# Patient Record
Sex: FEMALE | Race: WHITE | NOT HISPANIC OR LATINO | Employment: FULL TIME | URBAN - METROPOLITAN AREA
[De-identification: names, ages, dates, MRNs, and addresses within clinical notes are randomized per-mention and may not be internally consistent; named-entity substitution may affect disease eponyms.]

---

## 2018-05-29 ENCOUNTER — OFFICE VISIT CONVERTED (OUTPATIENT)
Dept: FAMILY MEDICINE CLINIC | Facility: CLINIC | Age: 28
End: 2018-05-29
Attending: NURSE PRACTITIONER

## 2020-08-28 ENCOUNTER — OFFICE VISIT CONVERTED (OUTPATIENT)
Dept: FAMILY MEDICINE CLINIC | Age: 30
End: 2020-08-28
Attending: NURSE PRACTITIONER

## 2020-10-23 ENCOUNTER — OFFICE VISIT CONVERTED (OUTPATIENT)
Dept: FAMILY MEDICINE CLINIC | Age: 30
End: 2020-10-23
Attending: NURSE PRACTITIONER

## 2020-10-23 ENCOUNTER — HOSPITAL ENCOUNTER (OUTPATIENT)
Dept: OTHER | Facility: HOSPITAL | Age: 30
Discharge: HOME | End: 2020-10-23
Attending: NURSE PRACTITIONER

## 2020-10-23 LAB
ALBUMIN SERPL-MCNC: 4.4 G/DL (ref 3.5–5)
ALBUMIN/GLOB SERPL: 1.7 {RATIO} (ref 1.4–2.6)
ALP SERPL-CCNC: 90 U/L (ref 42–98)
ALT SERPL-CCNC: 15 U/L (ref 10–40)
ANION GAP SERPL CALC-SCNC: 15 MMOL/L (ref 8–19)
AST SERPL-CCNC: 14 U/L (ref 15–50)
BASOPHILS # BLD AUTO: 0.06 10*3/UL (ref 0–0.2)
BASOPHILS NFR BLD AUTO: 0.8 % (ref 0–3)
BILIRUB SERPL-MCNC: 0.68 MG/DL (ref 0.2–1.3)
BUN SERPL-MCNC: 10 MG/DL (ref 5–25)
BUN/CREAT SERPL: 14 {RATIO} (ref 6–20)
CALCIUM SERPL-MCNC: 9.8 MG/DL (ref 8.7–10.4)
CHLORIDE SERPL-SCNC: 105 MMOL/L (ref 99–111)
CHOLEST SERPL-MCNC: 127 MG/DL (ref 107–200)
CHOLEST/HDLC SERPL: 2.6 {RATIO} (ref 3–6)
CONV ABS IMM GRAN: 0.02 10*3/UL (ref 0–0.2)
CONV CO2: 26 MMOL/L (ref 22–32)
CONV IMMATURE GRAN: 0.3 % (ref 0–1.8)
CONV TOTAL PROTEIN: 7 G/DL (ref 6.3–8.2)
CREAT UR-MCNC: 0.71 MG/DL (ref 0.5–0.9)
DEPRECATED RDW RBC AUTO: 41.8 FL (ref 36.4–46.3)
EOSINOPHIL # BLD AUTO: 0.11 10*3/UL (ref 0–0.7)
EOSINOPHIL # BLD AUTO: 1.4 % (ref 0–7)
ERYTHROCYTE [DISTWIDTH] IN BLOOD BY AUTOMATED COUNT: 12.7 % (ref 11.7–14.4)
FOLATE SERPL-MCNC: >20 NG/ML (ref 4.8–20)
GFR SERPLBLD BASED ON 1.73 SQ M-ARVRAT: >60 ML/MIN/{1.73_M2}
GLOBULIN UR ELPH-MCNC: 2.6 G/DL (ref 2–3.5)
GLUCOSE SERPL-MCNC: 89 MG/DL (ref 65–99)
HCT VFR BLD AUTO: 42.6 % (ref 37–47)
HDLC SERPL-MCNC: 49 MG/DL (ref 40–60)
HGB BLD-MCNC: 13.7 G/DL (ref 12–16)
IRON SATN MFR SERPL: 30 % (ref 20–55)
IRON SERPL-MCNC: 121 UG/DL (ref 60–170)
LDLC SERPL CALC-MCNC: 59 MG/DL (ref 70–100)
LYMPHOCYTES # BLD AUTO: 2.04 10*3/UL (ref 1–5)
LYMPHOCYTES NFR BLD AUTO: 25.9 % (ref 20–45)
MCH RBC QN AUTO: 28.9 PG (ref 27–31)
MCHC RBC AUTO-ENTMCNC: 32.2 G/DL (ref 33–37)
MCV RBC AUTO: 89.9 FL (ref 81–99)
MONOCYTES # BLD AUTO: 0.53 10*3/UL (ref 0.2–1.2)
MONOCYTES NFR BLD AUTO: 6.7 % (ref 3–10)
NEUTROPHILS # BLD AUTO: 5.12 10*3/UL (ref 2–8)
NEUTROPHILS NFR BLD AUTO: 64.9 % (ref 30–85)
NRBC CBCN: 0 % (ref 0–0.7)
OSMOLALITY SERPL CALC.SUM OF ELEC: 293 MOSM/KG (ref 273–304)
PLATELET # BLD AUTO: 264 10*3/UL (ref 130–400)
PMV BLD AUTO: 10.5 FL (ref 9.4–12.3)
POTASSIUM SERPL-SCNC: 4.4 MMOL/L (ref 3.5–5.3)
RBC # BLD AUTO: 4.74 10*6/UL (ref 4.2–5.4)
SODIUM SERPL-SCNC: 142 MMOL/L (ref 135–147)
TIBC SERPL-MCNC: 406 UG/DL (ref 245–450)
TRANSFERRIN SERPL-MCNC: 284 MG/DL (ref 250–380)
TRIGL SERPL-MCNC: 95 MG/DL (ref 40–150)
TSH SERPL-ACNC: 0.95 M[IU]/L (ref 0.27–4.2)
VIT B12 SERPL-MCNC: 328 PG/ML (ref 211–911)
VLDLC SERPL-MCNC: 19 MG/DL (ref 5–37)
WBC # BLD AUTO: 7.88 10*3/UL (ref 4.8–10.8)

## 2021-05-16 VITALS
TEMPERATURE: 98 F | RESPIRATION RATE: 16 BRPM | SYSTOLIC BLOOD PRESSURE: 131 MMHG | HEART RATE: 80 BPM | OXYGEN SATURATION: 98 % | DIASTOLIC BLOOD PRESSURE: 67 MMHG | HEIGHT: 55 IN | BODY MASS INDEX: 67.11 KG/M2 | WEIGHT: 290 LBS

## 2021-05-18 NOTE — PROGRESS NOTES
Lorraine Garza Jennifer  1990     Office/Outpatient Visit    Visit Date: Fri, Aug 28, 2020 09:45 am    Provider: Lizet Lizama N.P. (Assistant: Mari Carrasco LPN)    Location: [facility]        Electronically signed by Lizet Lizama N.P. on  08/28/2020 10:54:26 AM                             Subjective:        CC: Mrs. Garza is a 30 year old White female.  This is her first visit to the clinic.          HPI:           PHQ-9 Depression Screening: Completed form scanned and in chart; Total Score 4           Dx with generalized anxiety disorder; her symptom complex includes chest pain, palpitations, and shortness of breath.  True panic attacks occur in addition to generalized anxiety.  The frequency symptoms is nearly constant.  Apparent triggers include life stressors (new baby, building/selling house).  She is not currently being treated for anxiety.  Previous attempts at treatment have included antidepressants Zoloft.  was on 50mg for years She felt like this worked well - stopped due to pregnancy    ROS:     CONSTITUTIONAL:  Positive for unintentional weight gain ( but is working on losing again ).   Negative for chills, fatigue or fever.      CARDIOVASCULAR:  Negative for chest pain and pedal edema.      RESPIRATORY:  Negative for recent cough and dyspnea.      NEUROLOGICAL:  Negative for dizziness, fainting, headaches and paresthesias.      ENDOCRINE:  Negative for hair loss, polydipsia and polyphagia.      ALLERGIC/IMMUNOLOGIC:  Negative for seasonal allergies.      PSYCHIATRIC:  Positive for anxiety, feelings of stress and insomnia; trouble falling asleep.   Negative for depression, mood swings or suicidal thoughts.          Past Medical History / Family History / Social History:         Last Reviewed on 8/28/2020 10:18 AM by Lizet Lizama    Past Medical History:             PAST MEDICAL HISTORY     Hospitalizations: fever, admitted once on 1992         GYNECOLOGICAL  HISTORY:        No problems with menstrual cycles.    Not currently using any form of contraception.          CURRENT MEDICAL PROVIDERS:    Obstetrician/Gynecologist: dr cooper nuñez         PREVENTIVE HEALTH MAINTENANCE             DENTAL CLEANING: was last done - Dr. Orourke     EYE EXAM: was last done -   Paul     MAMMOGRAM: has never been done and has no medical need for one at this time     PAP SMEAR: was last done 2019 with normal results         Surgical History:         tooth extraction;    wisdom teeth extraction ;     Positive for     section: X 2; ;  was emergency due to pre-eclampsia; and    Gastric Sleeve 2017  - Dr. Nuñez (does not follow );;         Family History:     Father: Medical history unknown     Mother: Hypertension;  COPD     Brother(s): Healthy; 1 brother(s) total     Paternal Grandfather: ;  Alcoholism     Paternal Grandmother: Hypertension     Maternal Grandfather:  at age 75; Cause of death was CHF;  Type 2 Diabetes;  Alcoholism     Maternal Grandmother:  at age 78; Cause of death was alzheimer;  Hypertension;  Breast Cancer; Colon Cancer         Social History:     Occupation: Homemaker     Marital Status:      Children: 2 children         Tobacco/Alcohol/Supplements:     Last Reviewed on 2020 10:18 AM by Lizet Lizama    Tobacco: She has never smoked.          Alcohol: Frequency:    rarely; caffeine         Substance Abuse History:     Last Reviewed on 2020 10:18 AM by Lizet Lizama    None         Mental Health History:     Last Reviewed on 2020 10:18 AM by Lizet Lizama        Generalized Anxiety Disorder         Communicable Diseases (eg STDs):     Last Reviewed on 2020 10:18 AM by Lizet Lizama    Reportable health conditions;     trich         Current Problems:     Last Reviewed on 2020 10:18 AM by Lizet Lizama    Essential (primary) hypertension    Generalized  anxiety disorder    Nutritional anemia, unspecified    Encounter for screening for depression        Immunizations:     MenABCWY + OMV or MenABCWY + 1/4OMV or Placebo 3/5/2014    [employer]ck 9-Valent HPV Vaccine 11/12/2012    [employer]ck 9-Valent HPV Vaccine 1/16/2013    [employer]ck 9-Valent HPV Vaccine 5/15/2013    [facility] study MenACWY and / or Men B 11/9/2011    [facility] study MenACWY and / or Men B 1/18/2012    PPD 3/19/2014        Allergies:     Last Reviewed on 8/28/2020 10:18 AM by Lizet Lizama    No Known Allergies.        Current Medications:     Last Reviewed on 8/28/2020 10:18 AM by Lizet Lizama    multivitamin oral tablet [1 tab daily]        Objective:        Vitals: LMP - 8/4/2020        Current: 8/28/2020 9:49:00 AM    Ht:  5 ft, 6.142 in;  Wt: 199.2 lbs;  BMI: 32.0T: 97.2 F (oral);  BP: 136/89 mm Hg (left arm, sitting);  P: 65 bpm (left arm (BP Cuff), sitting);  sCr: 0.65 mg/dL;  GFR: 146.61        Exams:     PHYSICAL EXAM:     GENERAL: vital signs recorded - well developed, well nourished, obese;  no apparent distress;     RESPIRATORY: normal appearance and symmetric expansion of chest wall; normal respiratory rate and pattern with no distress; normal breath sounds with no rales, rhonchi, wheezes or rubs;     CARDIOVASCULAR: normal rate; rhythm is regular;  no edema;     MUSCULOSKELETAL: normal gait; normal range of motion of all major muscle groups; no limb or joint pain with range of motion;     NEUROLOGIC: mental status: alert and oriented x 3;     PSYCHIATRIC: affect/demeanor: anxious;  normal speech pattern; normal thought and perception;         Assessment:         Z13.31   Encounter for screening for depression       F41.1   Generalized anxiety disorder       I10   Essential (primary) hypertension           ORDERS:         Meds Prescribed:       [New Rx] sertraline 50 mg oral tablet [take 1/2 tab for 1 week then  1 tablet (50 mg) by oral route once daily], #30 (thirty) tablets, Refills: 1 (one)          Other Orders:         Depression screen positive and follow up plan documented  (In-House)                      Plan:         Encounter for screening for depression    MIPS PHQ-9 Depression Screening: Completed form scanned and in chart; Total Score 4 Positive Depression Screen: Pharmacologic intervention initiated/modified           Orders:         Depression screen positive and follow up plan documented  (In-House)              Generalized anxiety disorderwimonroe restart her back on zoloft - Discussed need to discuss with GYN - and need for birth control - discussed risks assoicated with taking if accidentally becomes pregnant - she is aware.  Will have her follwo up in 6-8 weeks with wellness/check up           Prescriptions:       [New Rx] sertraline 50 mg oral tablet [take 1/2 tab for 1 week then  1 tablet (50 mg) by oral route once daily], #30 (thirty) tablets, Refills: 1 (one)         Essential (primary) hypertension        RECOMMENDATIONS given include: perform routine monitoring of blood pressure with home blood pressure cuff, exercise, reduction of dietary salt intake, weight loss, and stress reduction.              Patient Recommendations:        For  Essential (primary) hypertension:    Begin monitoring your blood pressure by brief nurse visits at our office, a home blood pressure monitor, or by checking on the machines in pharmacies or stores.  Keep a log of the readings. Maintain a regular exercise program. Reduce the amount of salt in your diet. Try to lose some weight; even modest weight reduction can improve your blood pressure. Try and reduce the effects of stress on blood pressure by relaxation, meditation, biofeedback, exercise or other stress reduction methods.              Charge Capture:         Primary Diagnosis:     Z13.31  Encounter for screening for depression           Orders:      66734  Office visit - new pt, level 3  (In-House)              Depression screen positive  and follow up plan documented  (In-House)              F41.1  Generalized anxiety disorder     I10  Essential (primary) hypertension         ADDENDUMS:      ____________________________________    Addendum: 09/21/2020 12:45 Lizet Hernandez        [id number]    [id number]

## 2021-05-18 NOTE — PROGRESS NOTES
Lorraine Nayak 1990     Preventive Medicine Services    Visit Date: Fri, Oct 23, 2020 9:25 am    Provider: Lizet Lizama N.P. (Assistant: Kim Chavez MA )    Location:         Electronically signed by Lizet Lizama N.P. on  10/23/2020 01:06:49 PM                             Subjective:        CC: Mrs. Nayak is a 30 year old White female.  This is a follow-up visit.          HPI: LMP  1 month ago           Mrs. Nayak presents with encounter for general adult medical examination without abnormal findings.  She cannot recall when she last had a physical exam.  Her last menstrual period was 1 month ago.  She performs breast self-exams monthly.   Her last Pap smear was <6 months ago.   She has never had a mammogram. Preventative Health updated today.  She is current with her Td and influenza immunization.  Mrs. Nayak denies any history of abnormal Pap smears.            Essential (primary) hypertension details; compliance with treatment has been good.  She is tolerating the medication well without side effects.  She did not bring her blood pressure diary, but says that pressures have been okay.  history of  - no current medication  no current problems or concerns          Additionally, she presents with history of generalized anxiety disorder.  her anxiety disorder was originally diagnosed many years ago.  recently started on sertraline, currently taking 50mg  feels like it is helping, but thinks may need a higher dose.  She is currently going through a divorce, currently starting a new job and has a new baby at home - so is very stresssed           Complaint of nutritional anemia, unspecified..  She has a history of bariatric surgery and has had problems with anemia related to her malabsorbtion of nutrients in the past.  She was not able to take oral iron due to SE/GI upset  Feels like she is bruising more easily and wanting to get iron checked           Complaint of bariatric surgery  status..  Gastric sleeve in 2017     ROS:     CONSTITUTIONAL:  Positive for fatigue.   Negative for chills or fever.      CARDIOVASCULAR:  Positive for palpitations ( happens at least daily ).   Negative for chest pain or pedal edema.      RESPIRATORY:  Positive for dyspnea ( with the anxiety ).   Negative for recent cough.      GASTROINTESTINAL:  Negative for abdominal pain, nausea and vomiting.      GENITOURINARY:  Negative for dysuria and change in urine stream.      HEMATOLOGIC/LYMPHATIC:  Positive for easy bruising.      NEUROLOGICAL:  Negative for dizziness, fainting, headaches and paresthesias.      ENDOCRINE:  Negative for hair loss, polydipsia and polyphagia.      ALLERGIC/IMMUNOLOGIC:  Negative for seasonal allergies.      PSYCHIATRIC:  Positive for anxiety, crying spells, depression, feelings of stress and mood swings.   Negative for suicidal thoughts.          Past Medical History / Family History / Social History:         Last Reviewed on 10/23/2020 12:50 PM by Lizet Lizama    Past Medical History:             PAST MEDICAL HISTORY     Hospitalizations: fever, admitted once on          GYNECOLOGICAL HISTORY:        No problems with menstrual cycles.    Not currently using any form of contraception.          CURRENT MEDICAL PROVIDERS:    Obstetrician/Gynecologist: dr cooper nuñez         PREVENTIVE HEALTH MAINTENANCE             DENTAL CLEANING: was last done - Dr. Orourke     EYE EXAM: was last done -   Paul     MAMMOGRAM: has never been done and has no medical need for one at this time     PAP SMEAR: was last done 2019 with normal results         Surgical History:         tooth extraction;    wisdom teeth extraction ;     Positive for     section: X 2; ;  was emergency due to pre-eclampsia; and    Gastric Sleeve 2017  - Dr. Nuñez (does not follow );;         Family History:     Father: Medical history unknown     Mother: Hypertension;  COPD      Brother(s): Healthy; 1 brother(s) total     Paternal Grandfather: ;  Alcoholism     Paternal Grandmother: Hypertension     Maternal Grandfather:  at age 75; Cause of death was CHF;  Type 2 Diabetes;  Alcoholism     Maternal Grandmother:  at age 78; Cause of death was alzheimer;  Hypertension;  Breast Cancer; Colon Cancer         Social History:     Occupation: Homemaker     Marital Status:      Children: 2 children         Tobacco/Alcohol/Supplements:     Last Reviewed on 10/23/2020 12:50 PM by Lizet Lizama    Tobacco: She has never smoked.          Alcohol: Frequency:    rarely; caffeine         Substance Abuse History:     Last Reviewed on 10/23/2020 12:50 PM by Lizet Lizama    None         Mental Health History:     Last Reviewed on 10/23/2020 12:50 PM by Lizet Lizama        Generalized Anxiety Disorder         Communicable Diseases (eg STDs):     Last Reviewed on 10/23/2020 12:50 PM by Lizet Lizama    Reportable health conditions;     trich         Current Problems:     Last Reviewed on 10/23/2020 12:50 PM by Lizet Lizama    Essential (primary) hypertension    Generalized anxiety disorder    Nutritional anemia, unspecified    Encounter for screening for depression    Encounter for immunization    Bariatric surgery status    Encounter for general adult medical examination without abnormal findings        Immunizations:     MenABCWY + OMV or MenABCWY + 1/4OMV or Placebo 3/5/2014    [employer]ck 9-Valent HPV Vaccine 2012    Merck 9-Valent HPV Vaccine 2013    [employer]ck 9-Valent HPV Vaccine 5/15/2013    [employer] study MenACWY and / or Men B 2011    [employer] study MenACWY and / or Men B 2012    PPD 3/19/2014        Allergies:     Last Reviewed on 10/23/2020 12:50 PM by Lizet Lizama    No Known Allergies.        Current Medications:     Last Reviewed on 10/23/2020 12:50 PM by Lizet Lizama    multivitamin oral tablet [1 tab daily]    sertraline 50 mg  oral tablet [take 1 1/2 tab (75mg)  by oral route once daily for anxiety/ depression]    propranoloL 10 mg oral tablet [take 1 tablet (10 mg) by oral route up to TID PRN anxiety/ palpitations]        Objective:        Vitals:         Current: 10/23/2020 9:31:00 AM    Ht:  5 ft, 6.142 in;  Wt: 298.2 lbs;  BMI: 47.9T: 97 F (temporal);  BP: 128/78 mm Hg (left arm, sitting);  P: 62 bpm (left arm (BP Cuff), sitting);  sCr: 0.65 mg/dL;  GFR: 174.04        Exams:     PHYSICAL EXAM:     GENERAL: vital signs recorded - well developed, well nourished, obese;  no apparent distress;     NECK: range of motion is normal;     RESPIRATORY: normal appearance and symmetric expansion of chest wall; normal respiratory rate and pattern with no distress; normal breath sounds with no rales, rhonchi, wheezes or rubs;     CARDIOVASCULAR: normal rate; rhythm is regular;  no edema;     GASTROINTESTINAL: nontender; normal bowel sounds; no organomegaly;     MUSCULOSKELETAL: normal gait; normal range of motion of all major muscle groups; no limb or joint pain with range of motion;     NEUROLOGIC: mental status: alert and oriented x 3;     PSYCHIATRIC: affect/demeanor: anxious;  normal speech pattern; normal thought and perception;         Assessment:         Z00.00   Encounter for general adult medical examination without abnormal findings       Z23   Encounter for immunization       I10   Essential (primary) hypertension       F41.1   Generalized anxiety disorder       D53.9   Nutritional anemia, unspecified       Z98.84   Bariatric surgery status           ORDERS:         Meds Prescribed:       [New Rx] propranoloL 10 mg oral tablet [take 1 tablet (10 mg) by oral route up to TID PRN anxiety/ palpitations], #30 (thirty) tablets, Refills: 0 (zero)       [Refilled] sertraline 50 mg oral tablet [take 1 1/2 tab (75mg)  by oral route once daily for anxiety/ depression], #30 (thirty) tablets, Refills: 2 (two)         Lab Orders:       [id number]  B12FO -  [facility] Vitamin B12 with Folate  (Send-Out)            [id number]  IRONP - [facility] Iron and TIBC  (Send-Out)            82499  PHYSF - [facility] PHYSICAL: CMP, CBC, TSH, LIPID: [id number], [id number], [id number], [id number]  (Send-Out)              Procedures Ordered:       [id number]  Immunization administration; one vaccine  (In-House)              Other Orders:       [id number]  Influenza virus vaccine, quadrivalent, split virus, preservative free 3 years of age & older  (In-House)              Depression screen negative  (In-House)                      Plan:         Encounter for general adult medical examination without abnormal findings    LABORATORY:  Labs ordered to be performed today include PHYSICAL PANEL; CMP, CBC, TSH, LIPID.  MIPS PHQ-9 Depression Screening: Completed form scanned and in chart; Total Score 3; Negative Depression Screen           Orders:       [id number]  [facility] - [facility] PHYSICAL: CMP, CBC, TSH, LIPID: [id number], [id number], [id number], [id number]  (Send-Out)              Depression screen negative  (In-House)              Encounter for immunization          Immunizations:       96642  Immunization administration; one vaccine  (In-House)            19002  Influenza virus vaccine, quadrivalent, split virus, preservative free 3 years of age & older  (In-House)                Dose (ml): 0.5  Site: left deltoid  Route: intramuscular  Administered by: Kim Chavez          : Seqirus  Lot #: [id number]  Exp: 06/30/2021          NDC: [address]-96        Essential (primary) hypertensioncontinue to monitor        Generalized anxiety disorderwill increase her sertraline and give her a propranolol for PRN palpitations when these occur  follow up in 3 months          Prescriptions:       [New Rx] propranoloL 10 mg oral tablet [take 1 tablet (10 mg) by oral route up to TID PRN anxiety/ palpitations], #30 (thirty) tablets, Refills: 0 (zero)       [Refilled] sertraline 50 mg oral tablet [take 1 1/2 tab (75mg)  by oral route once daily for anxiety/  depression], #30 (thirty) tablets, Refills: 2 (two)         Nutritional anemia, unspecifiedwill check labs today        Bariatric surgery status    LABORATORY:  Labs ordered to be performed today include B12 with Folate and Iron, serum and TIBC.            Orders:       32193  B12FO - [facility] Vitamin B12 with Folate  (Send-Out)            22439  IRONP - [facility] Iron and TIBC  (Send-Out)                  Charge Capture:         Primary Diagnosis:     Z00.00  Encounter for general adult medical examination without abnormal findings           Orders:      00359  Preventive medicine, established patient, age 18-39 years  (In-House)              Depression screen negative  (In-House)              Z23  Encounter for immunization           Orders:      16399  Immunization administration; one vaccine  (In-House)            02418  Influenza virus vaccine, quadrivalent, split virus, preservative free 3 years of age & older  (In-House)              I10  Essential (primary) hypertension     F41.1  Generalized anxiety disorder     D53.9  Nutritional anemia, unspecified     Z98.84  Bariatric surgery status

## 2021-06-18 ENCOUNTER — OFFICE VISIT (OUTPATIENT)
Dept: FAMILY MEDICINE CLINIC | Age: 31
End: 2021-06-18

## 2021-06-18 VITALS
HEART RATE: 66 BPM | TEMPERATURE: 98.6 F | HEIGHT: 66 IN | BODY MASS INDEX: 47.09 KG/M2 | WEIGHT: 293 LBS | DIASTOLIC BLOOD PRESSURE: 86 MMHG | SYSTOLIC BLOOD PRESSURE: 138 MMHG

## 2021-06-18 DIAGNOSIS — R00.2 PALPITATIONS: ICD-10-CM

## 2021-06-18 DIAGNOSIS — F41.9 ANXIETY: Primary | Chronic | ICD-10-CM

## 2021-06-18 PROBLEM — F32.A DEPRESSION: Status: ACTIVE | Noted: 2021-06-18

## 2021-06-18 PROBLEM — R01.1 HEART MURMUR: Status: ACTIVE | Noted: 2021-06-18

## 2021-06-18 PROBLEM — I10 HIGH BLOOD PRESSURE: Status: ACTIVE | Noted: 2021-06-18

## 2021-06-18 PROBLEM — Z98.84 HISTORY OF BARIATRIC SURGERY: Status: ACTIVE | Noted: 2021-06-18

## 2021-06-18 PROBLEM — K64.9 HEMORRHOIDS: Status: ACTIVE | Noted: 2021-06-18

## 2021-06-18 PROBLEM — K21.9 ACID REFLUX: Status: ACTIVE | Noted: 2021-06-18

## 2021-06-18 PROCEDURE — 99214 OFFICE O/P EST MOD 30 MIN: CPT | Performed by: NURSE PRACTITIONER

## 2021-06-18 NOTE — PROGRESS NOTES
"Chief Complaint  Med Refill    Subjective          Lorraine Nayak presents to [facility]  Regarding anxiety, Lorraine is here for refills on her sertraline.Last visit in October 2020 last encounter noted new prescription for propanolol to take as needed she is currently taking PRN and works well. She does not need refills today  She reports that she has been out of her medication for about 3 months.  She has noticed that over the past 3 months that she has been having some palpitations.  This generally occurs when she feels like she is eating quickly or at least this is the time that she notices it the most.  She reports that her heart rate will get as high as 120 based on her apple watch report.  Other than feeling the palpitations, she denies any other associated symptoms including dizziness, lightheadedness, or chest pain.         Review of Systems   Constitutional: Negative for fatigue and fever.   Respiratory: Negative for cough, chest tightness and shortness of breath.    Cardiovascular: Positive for palpitations (notices when she eats quickly.  Not sure if related to type of foods). Negative for chest pain.   Musculoskeletal: Negative for back pain and neck pain.   Neurological: Negative for dizziness.   Psychiatric/Behavioral: Negative for behavioral problems and dysphoric mood. The patient is nervous/anxious.         Health Maintenance Due   Topic Date Due   • ANNUAL PHYSICAL  Never done   • COVID-19 Vaccine (1) Never done   • HEPATITIS C SCREENING  Never done   • PAP SMEAR  Never done        Objective     Vital Signs:   /86 (BP Location: Left arm, Patient Position: Sitting)   Pulse 66   Temp 98.6 °F (37 °C)   Ht 168 cm (66.14\")   Wt (!) 140 kg (309 lb 9.6 oz)   BMI 49.76 kg/m²       Physical Exam  Constitutional:       General: She is not in acute distress.     Appearance: Normal appearance. She is obese.   HENT:      Head: Normocephalic.   Cardiovascular:    "   Rate and Rhythm: Normal rate and regular rhythm.   Pulmonary:      Effort: Pulmonary effort is normal.      Breath sounds: Normal breath sounds.   Musculoskeletal:         General: Normal range of motion.   Neurological:      General: No focal deficit present.      Mental Status: She is alert and oriented to person, place, and time.   Psychiatric:         Mood and Affect: Mood normal.         Behavior: Behavior normal.          Result Review :     The following data was reviewed by: ROSA ISELA Blunt on 06/18/2021:  Physical Test Panel (10/23/2020 10:21)                 Assessment and Plan    Diagnoses and all orders for this visit:    1. Anxiety (Primary)  Comments:  Will resume sertraline but at 50mg  consider increase at the 8 week follow up, continue propanolol PRN  follow up 8 weeks  Orders:  -     sertraline (ZOLOFT) 50 MG tablet; Take 1 tablet by mouth Daily for 60 days.  Dispense: 60 tablet; Refill: 0    2. Palpitations  Comments:  Will see if resuming the anxiety medication improves symptoms, if continues, will consider 24 hour holter/labs at next visit in 8 weeks - sooner if symptoms            Follow Up {Wrapup  Communications :23}   Return in about 8 weeks (around 8/13/2021).      Patient was given instructions and counseling regarding her condition or for health maintenance advice. Please see specific information pulled into the AVS if appropriate.

## 2021-07-02 VITALS
BODY MASS INDEX: 47.09 KG/M2 | TEMPERATURE: 97 F | SYSTOLIC BLOOD PRESSURE: 128 MMHG | HEIGHT: 66 IN | HEART RATE: 62 BPM | DIASTOLIC BLOOD PRESSURE: 78 MMHG | WEIGHT: 293 LBS

## 2021-07-02 VITALS
WEIGHT: 199.2 LBS | TEMPERATURE: 97.2 F | SYSTOLIC BLOOD PRESSURE: 136 MMHG | BODY MASS INDEX: 32.02 KG/M2 | HEART RATE: 65 BPM | DIASTOLIC BLOOD PRESSURE: 89 MMHG | HEIGHT: 66 IN

## 2021-07-20 ENCOUNTER — OFFICE VISIT (OUTPATIENT)
Dept: FAMILY MEDICINE CLINIC | Age: 31
End: 2021-07-20

## 2021-07-20 VITALS
WEIGHT: 293 LBS | TEMPERATURE: 98.3 F | BODY MASS INDEX: 47.09 KG/M2 | HEART RATE: 78 BPM | HEIGHT: 66 IN | DIASTOLIC BLOOD PRESSURE: 90 MMHG | SYSTOLIC BLOOD PRESSURE: 132 MMHG

## 2021-07-20 DIAGNOSIS — K21.9 GASTROESOPHAGEAL REFLUX DISEASE WITHOUT ESOPHAGITIS: ICD-10-CM

## 2021-07-20 DIAGNOSIS — L73.2 HIDRADENITIS SUPPURATIVA OF RIGHT AXILLA: Primary | ICD-10-CM

## 2021-07-20 PROCEDURE — 99213 OFFICE O/P EST LOW 20 MIN: CPT | Performed by: NURSE PRACTITIONER

## 2021-07-20 RX ORDER — DOXYCYCLINE 100 MG/1
100 CAPSULE ORAL 2 TIMES DAILY
Qty: 20 CAPSULE | Refills: 0 | Status: SHIPPED | OUTPATIENT
Start: 2021-07-20 | End: 2021-08-13

## 2021-07-20 NOTE — PATIENT INSTRUCTIONS
Hidradenitis Suppurativa  Hidradenitis suppurativa is a long-term (chronic) skin disease. It is similar to a severe form of acne, but it affects areas of the body where acne would be unusual, especially areas of the body where skin rubs against skin and becomes moist. These include:  · Underarms.  · Groin.  · Genital area.  · Buttocks.  · Upper thighs.  · Breasts.  Hidradenitis suppurativa may start out as small lumps or pimples caused by blocked sweat glands or hair follicles. Pimples may develop into deep sores that break open (rupture) and drain pus. Over time, affected areas of skin may thicken and become scarred. This condition is rare and does not spread from person to person (non-contagious).  What are the causes?  The exact cause of this condition is not known. It may be related to:  · Male and female hormones.  · An overactive disease-fighting system (immune system). The immune system may over-react to blocked hair follicles or sweat glands and cause swelling and pus-filled sores.  What increases the risk?  You are more likely to develop this condition if you:  · Are female.  · Are 11-55 years old.  · Have a family history of hidradenitis suppurativa.  · Have a personal history of acne.  · Are overweight.  · Smoke.  · Take the medicine lithium.  What are the signs or symptoms?  The first symptoms are usually painful bumps in the skin, similar to pimples. The condition may get worse over time (progress), or it may only cause mild symptoms. If the disease progresses, symptoms may include:  · Skin bumps getting bigger and growing deeper into the skin.  · Bumps rupturing and draining pus.  · Itchy, infected skin.  · Skin getting thicker and scarred.  · Tunnels under the skin (fistulas) where pus drains from a bump.  · Pain during daily activities, such as pain during walking if your groin area is affected.  · Emotional problems, such as stress or depression. This condition may affect your appearance and your  ability or willingness to wear certain clothes or do certain activities.  How is this diagnosed?  This condition is diagnosed by a health care provider who specializes in skin diseases (dermatologist). You may be diagnosed based on:  · Your symptoms and medical history.  · A physical exam.  · Testing a pus sample for infection.  · Blood tests.  How is this treated?  Your treatment will depend on how severe your symptoms are. The same treatment will not work for everybody with this condition. You may need to try several treatments to find what works best for you. Treatment may include:  · Cleaning and bandaging (dressing) your wounds as needed.  · Lifestyle changes, such as new skin care routines.  · Taking medicines, such as:  ? Antibiotics.  ? Acne medicines.  ? Medicines to reduce the activity of the immune system.  ? A diabetes medicine (metformin).  ? Birth control pills, for women.  ? Steroids to reduce swelling and pain.  · Working with a mental health care provider, if you experience emotional distress due to this condition.  If you have severe symptoms that do not get better with medicine, you may need surgery. Surgery may involve:  · Using a laser to clear the skin and remove hair follicles.  · Opening and draining deep sores.  · Removing the areas of skin that are diseased and scarred.  Follow these instructions at home:  Medicines    · Take over-the-counter and prescription medicines only as told by your health care provider.  · If you were prescribed an antibiotic medicine, take it as told by your health care provider. Do not stop taking the antibiotic even if your condition improves.  Skin care  · If you have open wounds, cover them with a clean dressing as told by your health care provider. Keep wounds clean by washing them gently with soap and water when you bathe.  · Do not shave the areas where you get hidradenitis suppurativa.  · Do not wear deodorant.  · Wear loose-fitting clothes.  · Try to avoid  getting overheated or sweaty. If you get sweaty or wet, change into clean, dry clothes as soon as you can.  · To help relieve pain and itchiness, cover sore areas with a warm, clean washcloth (warm compress) for 5-10 minutes as often as needed.  · If told by your health care provider, take a bleach bath twice a week:  ? Fill your bathtub custodial with water.  ? Pour in ½ cup of unscented household bleach.  ? Soak in the tub for 5-10 minutes.  ? Only soak from the neck down. Avoid water on your face and hair.  ? Shower to rinse off the bleach from your skin.  General instructions  · Learn as much as you can about your disease so that you have an active role in your treatment. Work closely with your health care provider to find treatments that work for you.  · If you are overweight, work with your health care provider to lose weight as recommended.  · Do not use any products that contain nicotine or tobacco, such as cigarettes and e-cigarettes. If you need help quitting, ask your health care provider.  · If you struggle with living with this condition, talk with your health care provider or work with a mental health care provider as recommended.  · Keep all follow-up visits as told by your health care provider. This is important.  Where to find more information  · Hidradenitis Suppurativa [employer].: https://www.hs-foundation.org/  Contact a health care provider if you have:  · A flare-up of hidradenitis suppurativa.  · A fever or chills.  · Trouble controlling your symptoms at home.  · Trouble doing your daily activities because of your symptoms.  · Trouble dealing with emotional problems related to your condition.  Summary  · Hidradenitis suppurativa is a long-term (chronic) skin disease. It is similar to a severe form of acne, but it affects areas of the body where acne would be unusual.  · The first symptoms are usually painful bumps in the skin, similar to pimples. The condition may get worse over time  (progress), or it may only cause mild symptoms.  · If you have open wounds, cover them with a clean dressing as told by your health care provider. Keep wounds clean by washing them gently with soap and water when you bathe.  · Besides skin care, treatment may include medicines, laser treatment, and surgery.  This information is not intended to replace advice given to you by your health care provider. Make sure you discuss any questions you have with your health care provider.  Document Revised: 12/26/2018 Document Reviewed: 12/26/2018  [employer] Patient Education © 2021 [employer]

## 2021-07-20 NOTE — ASSESSMENT & PLAN NOTE
Treat with doxycycline.  Take medicine with food and this medicine may cause photosensitivity so please wear sunscreen if out in the sun.  Follow-up if not improving and consider referral to dermatology

## 2021-07-20 NOTE — ASSESSMENT & PLAN NOTE
Reflux precautions and okay to restart an over-the-counter acid reducing medicine for 2 weeks and follow-up if not improving

## 2021-07-20 NOTE — PROGRESS NOTES
"Chief Complaint  Abscess    Subjective  Lorraine is a 30-year-old female here today with boils located in the right axillary area.  She ordinarily will get one skin boil under her arm about once per year but this year this was not going away and she has 4 or 5 boils present.  She has open one of the areas but none are currently draining.  She has never had any of these boils occur in any other location.  She has Mirena IUD.  And she notes her throat feels little scratchy and she is having some mild increase in acid reflux symptoms.  She does have a gastric sleeve.        Lorraine Nayak presents to [facility]    Review of Systems   Constitutional: Negative.    Respiratory: Negative.    Cardiovascular: Negative.    Gastrointestinal: Negative.    Genitourinary: Negative.    Skin:        Boils right axilla   Neurological: Negative.    Psychiatric/Behavioral: Negative.         Objective   Vital Signs:   Vitals:    07/20/21 1352   BP: 132/90   BP Location: Left arm   Patient Position: Sitting   Pulse: 78   Temp: 98.3 °F (36.8 °C)   TempSrc: Oral   Weight: (!) 138 kg (304 lb 12.8 oz)   Height: 167.6 cm (66\")      Physical Exam  Vitals reviewed.   Constitutional:       General: She is not in acute distress.     Appearance: Normal appearance. She is well-developed. She is obese.   HENT:      Mouth/Throat:      Mouth: Mucous membranes are moist.      Pharynx: Oropharynx is clear. No oropharyngeal exudate or posterior oropharyngeal erythema.   Cardiovascular:      Rate and Rhythm: Normal rate and regular rhythm.      Heart sounds: Normal heart sounds.   Pulmonary:      Effort: Pulmonary effort is normal.      Breath sounds: Normal breath sounds.   Musculoskeletal:      Right lower leg: No edema.      Left lower leg: No edema.   Skin:     General: Skin is warm and dry.          Neurological:      General: No focal deficit present.      Mental Status: She is alert.   Psychiatric:         " Attention and Perception: Attention normal.         Mood and Affect: Mood and affect normal.         Behavior: Behavior normal.          Result Review :              Assessment and Plan    Diagnoses and all orders for this visit:    1. Hidradenitis suppurativa of right axilla (Primary)  Assessment & Plan:  Treat with doxycycline.  Take medicine with food and this medicine may cause photosensitivity so please wear sunscreen if out in the sun.  Follow-up if not improving and consider referral to dermatology    Orders:  -     doxycycline (MONODOX) 100 MG capsule; Take 1 capsule by mouth 2 (Two) Times a Day.  Dispense: 20 capsule; Refill: 0    2. Gastroesophageal reflux disease without esophagitis  Assessment & Plan:  Reflux precautions and okay to restart an over-the-counter acid reducing medicine for 2 weeks and follow-up if not improving        Follow Up    No follow-ups on file.  Patient was given instructions and counseling regarding her condition or for health maintenance advice. Please see specific information pulled into the AVS if appropriate.

## 2021-08-13 ENCOUNTER — OFFICE VISIT (OUTPATIENT)
Dept: FAMILY MEDICINE CLINIC | Age: 31
End: 2021-08-13

## 2021-08-13 VITALS
HEIGHT: 66 IN | SYSTOLIC BLOOD PRESSURE: 137 MMHG | HEART RATE: 57 BPM | TEMPERATURE: 98.9 F | BODY MASS INDEX: 47.09 KG/M2 | WEIGHT: 293 LBS | DIASTOLIC BLOOD PRESSURE: 72 MMHG

## 2021-08-13 DIAGNOSIS — R00.2 PALPITATIONS: Chronic | ICD-10-CM

## 2021-08-13 DIAGNOSIS — F41.9 ANXIETY: Primary | Chronic | ICD-10-CM

## 2021-08-13 PROCEDURE — 99214 OFFICE O/P EST MOD 30 MIN: CPT | Performed by: NURSE PRACTITIONER

## 2021-08-13 RX ORDER — PROPRANOLOL HYDROCHLORIDE 10 MG/1
10 TABLET ORAL 3 TIMES DAILY PRN
Qty: 30 TABLET | Refills: 0 | Status: SHIPPED | OUTPATIENT
Start: 2021-08-13 | End: 2022-08-22 | Stop reason: SDUPTHER

## 2021-08-13 RX ORDER — SERTRALINE HYDROCHLORIDE 100 MG/1
100 TABLET, FILM COATED ORAL DAILY
Qty: 30 TABLET | Refills: 4 | Status: SHIPPED | OUTPATIENT
Start: 2021-08-13 | End: 2021-12-20 | Stop reason: SDUPTHER

## 2021-08-13 NOTE — PROGRESS NOTES
Chief Complaint  Lorraine Nayak presents to [facility] for Anxiety    Subjective          Anxiety: Patient complains of anxiety disorder.  She has the following symptoms: chest pain, palpitations. Onset of symptoms was approximately years ago, gradually worsening since that time. She denies current suicidal and homicidal ideation.  Previous treatment includes Zoloft at a higher dose (150mg) .  She complains of the following side effects from the treatment: none.   She did increase the sertraline to 100mg from 75mg about 1 month ago and reports that her symptoms of chest pain and palpitations have improved.  She is using propranolol several times per month          Review of Systems   Constitutional: Negative for fatigue and fever.   Respiratory: Negative for shortness of breath.    Cardiovascular: Positive for chest pain and palpitations.   Psychiatric/Behavioral: Negative for depressed mood. The patient is nervous/anxious.          No Known Allergies   Past Medical History:   Diagnosis Date   • Bariatric surgery status    • Essential (primary) hypertension    • Generalized anxiety disorder    • Nutritional anemia, unspecified      Current Outpatient Medications   Medication Sig Dispense Refill   • levonorgestrel (MIRENA) 20 MCG/24HR IUD 1 each by Intrauterine route 1 (One) Time.     • propranolol (INDERAL) 10 MG tablet Take 1 tablet by mouth 3 (Three) Times a Day As Needed (palpitations/anxiety) for up to 30 doses. 30 tablet 0   • sertraline (Zoloft) 100 MG tablet Take 1 tablet by mouth Daily for 150 days. 30 tablet 4     No current facility-administered medications for this visit.     Past Surgical History:   Procedure Laterality Date   •  SECTION      X2; 2015, 2019; 2015 was emergency due to pre-eclampsia   • SLEEVE GASTROPLASTY  2017    Dr. Pugh (Does not follow)   • TOOTH EXTRACTION     • WISDOM TOOTH EXTRACTION  10/2013      Social History     Tobacco Use  "  • Smoking status: Never Smoker   • Smokeless tobacco: Never Used   Substance Use Topics   • Alcohol use: Yes     Comment: rarely   • Drug use: Never     Family History   Problem Relation Age of Onset   • Hypertension Mother    • COPD Mother    • No Known Problems Brother    • Alzheimer's disease Maternal Grandmother         cause of death   • Hypertension Maternal Grandmother    • Breast cancer Maternal Grandmother    • Colon cancer Maternal Grandmother    • Heart failure Maternal Grandfather         cause of death   • Diabetes type II Maternal Grandfather    • Alcohol abuse Maternal Grandfather    • Hypertension Paternal Grandmother    • Alcohol abuse Paternal Grandfather      Health Maintenance Due   Topic Date Due   • ANNUAL PHYSICAL  Never done   • COVID-19 Vaccine (1) Never done   • HEPATITIS C SCREENING  Never done   • PAP SMEAR  Never done      Immunization History   Administered Date(s) Administered   • Flu Vaccine Quad PF >36MO 09/11/2019   • Flu Vaccine Split Quad 01/04/2019, 09/11/2019   • HPV, Unspecified 11/12/2012, 01/16/2013, 05/15/2013   • Influenza, Unspecified 10/23/2020   • Meningococcal Conjugate 11/09/2011, 01/18/2012, 03/05/2014   • PPD Test 03/19/2014   • Rho (D) Immune Globulin 08/06/2019   • Tdap 04/25/2017, 08/06/2019        Objective     Vitals:    08/13/21 0812   BP: 137/72   BP Location: Left arm   Patient Position: Sitting   Pulse: 57   Temp: 98.9 °F (37.2 °C)   TempSrc: Oral   Weight: (!) 137 kg (302 lb 9.6 oz)   Height: 167.6 cm (66\")     Body mass index is 48.84 kg/m².     Physical Exam  Constitutional:       General: She is not in acute distress.     Appearance: Normal appearance. She is obese.   HENT:      Head: Normocephalic.   Cardiovascular:      Rate and Rhythm: Normal rate and regular rhythm.   Pulmonary:      Effort: Pulmonary effort is normal.      Breath sounds: Normal breath sounds.   Musculoskeletal:         General: Normal range of motion.   Neurological:      General: " No focal deficit present.      Mental Status: She is alert and oriented to person, place, and time.   Psychiatric:         Mood and Affect: Mood normal.         Behavior: Behavior normal.           Result Review :                           Assessment and Plan      Diagnoses and all orders for this visit:    1. Anxiety (Primary)  Comments:  Increase the sertraline to 100mg daily.  discussed alternative.  She wishes to continue medication daily and PRN- follow up in 3 months  Assessment & Plan:  Taking Sertraline daily  and propranolol PRN    Orders:  -     sertraline (Zoloft) 100 MG tablet; Take 1 tablet by mouth Daily for 150 days.  Dispense: 30 tablet; Refill: 4  -     propranolol (INDERAL) 10 MG tablet; Take 1 tablet by mouth 3 (Three) Times a Day As Needed (palpitations/anxiety) for up to 30 doses.  Dispense: 30 tablet; Refill: 0    2. Palpitations  Comments:  if continues to increase in frequency, consider holter   Orders:  -     propranolol (INDERAL) 10 MG tablet; Take 1 tablet by mouth 3 (Three) Times a Day As Needed (palpitations/anxiety) for up to 30 doses.  Dispense: 30 tablet; Refill: 0            Follow Up     Return for Next scheduled follow up.    Patient was given instructions and counseling regarding her condition or for health maintenance advice. Please see specific information pulled into the AVS if appropriate.

## 2021-12-20 ENCOUNTER — OFFICE VISIT (OUTPATIENT)
Dept: FAMILY MEDICINE CLINIC | Age: 31
End: 2021-12-20

## 2021-12-20 ENCOUNTER — LAB (OUTPATIENT)
Dept: LAB | Facility: HOSPITAL | Age: 31
End: 2021-12-20

## 2021-12-20 VITALS
SYSTOLIC BLOOD PRESSURE: 134 MMHG | HEIGHT: 66 IN | TEMPERATURE: 98.9 F | HEART RATE: 63 BPM | BODY MASS INDEX: 47.09 KG/M2 | DIASTOLIC BLOOD PRESSURE: 80 MMHG | WEIGHT: 293 LBS

## 2021-12-20 DIAGNOSIS — F33.42 RECURRENT MAJOR DEPRESSIVE DISORDER, IN FULL REMISSION (HCC): ICD-10-CM

## 2021-12-20 DIAGNOSIS — Z11.59 SCREENING FOR VIRAL DISEASE: ICD-10-CM

## 2021-12-20 DIAGNOSIS — R00.2 PALPITATIONS: ICD-10-CM

## 2021-12-20 DIAGNOSIS — Z00.00 ROUTINE GENERAL MEDICAL EXAMINATION AT A HEALTH CARE FACILITY: Primary | ICD-10-CM

## 2021-12-20 DIAGNOSIS — K21.9 GASTROESOPHAGEAL REFLUX DISEASE, UNSPECIFIED WHETHER ESOPHAGITIS PRESENT: ICD-10-CM

## 2021-12-20 DIAGNOSIS — I10 ESSENTIAL (PRIMARY) HYPERTENSION: ICD-10-CM

## 2021-12-20 DIAGNOSIS — Z98.84 BARIATRIC SURGERY STATUS: ICD-10-CM

## 2021-12-20 DIAGNOSIS — L73.2 HIDRADENITIS SUPPURATIVA OF RIGHT AXILLA: ICD-10-CM

## 2021-12-20 DIAGNOSIS — Z13.6 SCREENING FOR CARDIOVASCULAR CONDITION: ICD-10-CM

## 2021-12-20 DIAGNOSIS — F41.9 ANXIETY: Chronic | ICD-10-CM

## 2021-12-20 LAB
ALBUMIN SERPL-MCNC: 4.2 G/DL (ref 3.5–5.2)
ALBUMIN/GLOB SERPL: 1.5 G/DL
ALP SERPL-CCNC: 96 U/L (ref 39–117)
ALT SERPL W P-5'-P-CCNC: 15 U/L (ref 1–33)
ANION GAP SERPL CALCULATED.3IONS-SCNC: 6.7 MMOL/L (ref 5–15)
AST SERPL-CCNC: 11 U/L (ref 1–32)
BILIRUB SERPL-MCNC: 0.5 MG/DL (ref 0–1.2)
BUN SERPL-MCNC: 6 MG/DL (ref 6–20)
BUN/CREAT SERPL: 8.6 (ref 7–25)
CALCIUM SPEC-SCNC: 9.3 MG/DL (ref 8.6–10.5)
CHLORIDE SERPL-SCNC: 107 MMOL/L (ref 98–107)
CHOLEST SERPL-MCNC: 111 MG/DL (ref 0–200)
CO2 SERPL-SCNC: 27.3 MMOL/L (ref 22–29)
CREAT SERPL-MCNC: 0.7 MG/DL (ref 0.57–1)
DEPRECATED RDW RBC AUTO: 42.1 FL (ref 37–54)
ERYTHROCYTE [DISTWIDTH] IN BLOOD BY AUTOMATED COUNT: 12.8 % (ref 12.3–15.4)
GFR SERPL CREATININE-BSD FRML MDRD: 99 ML/MIN/1.73
GLOBULIN UR ELPH-MCNC: 2.8 GM/DL
GLUCOSE SERPL-MCNC: 89 MG/DL (ref 65–99)
HCT VFR BLD AUTO: 39 % (ref 34–46.6)
HCV AB SER DONR QL: NORMAL
HDLC SERPL-MCNC: 50 MG/DL (ref 40–60)
HGB BLD-MCNC: 12.8 G/DL (ref 12–15.9)
IRON 24H UR-MRATE: 101 MCG/DL (ref 37–145)
LDLC SERPL CALC-MCNC: 44 MG/DL (ref 0–100)
LDLC/HDLC SERPL: 0.88 {RATIO}
MCH RBC QN AUTO: 29.8 PG (ref 26.6–33)
MCHC RBC AUTO-ENTMCNC: 32.8 G/DL (ref 31.5–35.7)
MCV RBC AUTO: 90.7 FL (ref 79–97)
PLATELET # BLD AUTO: 244 10*3/MM3 (ref 140–450)
PMV BLD AUTO: 9.3 FL (ref 6–12)
POTASSIUM SERPL-SCNC: 4.3 MMOL/L (ref 3.5–5.2)
PROT SERPL-MCNC: 7 G/DL (ref 6–8.5)
RBC # BLD AUTO: 4.3 10*6/MM3 (ref 3.77–5.28)
SODIUM SERPL-SCNC: 141 MMOL/L (ref 136–145)
TRIGL SERPL-MCNC: 84 MG/DL (ref 0–150)
VIT B12 BLD-MCNC: 597 PG/ML (ref 211–946)
VLDLC SERPL-MCNC: 17 MG/DL (ref 5–40)
WBC NRBC COR # BLD: 7.31 10*3/MM3 (ref 3.4–10.8)

## 2021-12-20 PROCEDURE — 99395 PREV VISIT EST AGE 18-39: CPT | Performed by: NURSE PRACTITIONER

## 2021-12-20 PROCEDURE — 82607 VITAMIN B-12: CPT

## 2021-12-20 PROCEDURE — 86803 HEPATITIS C AB TEST: CPT

## 2021-12-20 PROCEDURE — 85027 COMPLETE CBC AUTOMATED: CPT

## 2021-12-20 PROCEDURE — 36415 COLL VENOUS BLD VENIPUNCTURE: CPT

## 2021-12-20 PROCEDURE — 83540 ASSAY OF IRON: CPT

## 2021-12-20 PROCEDURE — 80053 COMPREHEN METABOLIC PANEL: CPT

## 2021-12-20 PROCEDURE — 80061 LIPID PANEL: CPT

## 2021-12-20 RX ORDER — SERTRALINE HYDROCHLORIDE 100 MG/1
100 TABLET, FILM COATED ORAL DAILY
Qty: 30 TABLET | Refills: 5 | Status: SHIPPED | OUTPATIENT
Start: 2021-12-20 | End: 2022-08-08 | Stop reason: SDUPTHER

## 2021-12-20 NOTE — ASSESSMENT & PLAN NOTE
Patient's depression is recurrent and is moderate without psychosis. Their depression is currently in full remission and the condition is unchanged. This will be reassessed at the next regular appointment. F/U as described:patient will continue current medication therapy.

## 2021-12-20 NOTE — PROGRESS NOTES
Chief Complaint  Lorraine Nayak presents to [facility] for Annual Exam (f/u )    Subjective          Lorraine is here today for annual exam.  Last annual exam was 1 year ago.     Immunization status:  Declines flu vaccine  Last eye exam:   2020 - Paul  Last dental exam:   No 2021 - Jones  Smoking status:   Social History    Tobacco Use      Smoking status: Never Smoker      Smokeless tobacco: Never Used    Alcohol use:   Social History    Substance and Sexual Activity      Alcohol use: Yes        Comment: rarely     Recreational drug use:   Social History    Substance and Sexual Activity      Drug use: Never     Diet / exercise:   none  Sexually active:   Yes     LMP:   N/a since mirena - 2 months ago  Last pap:   Last Completed Pap Smear     This patient has no relevant Health Maintenance data.               Lorraine presents today for follow up on anxiety.  She reports they are doing well on current treatment.  Current treatment includes :  Sertraline 100mg daily .          Review of Systems   Constitutional: Positive for fatigue.   HENT: Negative for congestion and sinus pressure.    Eyes: Negative.    Respiratory: Negative for cough and shortness of breath.    Cardiovascular: Positive for palpitations (occasionally). Negative for chest pain and leg swelling.   Gastrointestinal: Negative for constipation, diarrhea, nausea and vomiting.   Genitourinary: Negative for dysuria, vaginal discharge and vaginal pain.   Musculoskeletal: Negative for arthralgias.   Skin:        HS to right axilla     Neurological: Negative for dizziness and weakness.   Psychiatric/Behavioral: Negative for sleep disturbance and depressed mood. Nervous/anxious: controlled on medication.          No Known Allergies   Past Medical History:   Diagnosis Date   • Bariatric surgery status    • Essential (primary) hypertension    • Generalized anxiety disorder    • Nutritional anemia, unspecified      Current  Outpatient Medications   Medication Sig Dispense Refill   • levonorgestrel (MIRENA) 20 MCG/24HR IUD 1 each by Intrauterine route 1 (One) Time.     • propranolol (INDERAL) 10 MG tablet Take 1 tablet by mouth 3 (Three) Times a Day As Needed (palpitations/anxiety) for up to 30 doses. 30 tablet 0   • sertraline (Zoloft) 100 MG tablet Take 1 tablet by mouth Daily. 30 tablet 5     No current facility-administered medications for this visit.     Past Surgical History:   Procedure Laterality Date   •  SECTION      X2; , 2019; 2015 was emergency due to pre-eclampsia   • SLEEVE GASTROPLASTY  2017    Dr. Pugh (Does not follow)   • TOOTH EXTRACTION     • WISDOM TOOTH EXTRACTION  10/2013      Social History     Tobacco Use   • Smoking status: Never Smoker   • Smokeless tobacco: Never Used   Substance Use Topics   • Alcohol use: Yes     Comment: rarely   • Drug use: Never     Family History   Problem Relation Age of Onset   • Hypertension Mother    • COPD Mother    • Diabetes Mother    • No Known Problems Brother    • Alzheimer's disease Maternal Grandmother         cause of death   • Hypertension Maternal Grandmother    • Breast cancer Maternal Grandmother    • Colon cancer Maternal Grandmother    • Heart failure Maternal Grandfather         cause of death   • Diabetes type II Maternal Grandfather    • Alcohol abuse Maternal Grandfather    • Hypertension Paternal Grandmother    • Alcohol abuse Paternal Grandfather      Health Maintenance Due   Topic Date Due   • HEPATITIS C SCREENING  Never done      Immunization History   Administered Date(s) Administered   • COVID-19 (PFIZER) 2021, 2021   • Flu Vaccine Quad PF >36MO 2019   • Flu Vaccine Split Quad 2019, 2019, 2019, 2019   • HPV, Unspecified 2012, 2012, 2013, 2013, 05/15/2013, 05/15/2013   • Influenza, Unspecified 10/23/2020, 10/23/2020   • Meningococcal Conjugate 2011, 2011,  "01/18/2012, 01/18/2012, 03/05/2014, 03/05/2014   • PPD Test 03/19/2014, 03/19/2014   • Rho (D) Immune Globulin 08/06/2019   • Tdap 04/25/2017, 08/06/2019, 08/06/2019        Objective     Vitals:    12/20/21 0911   BP: 134/80   BP Location: Left arm   Patient Position: Sitting   Pulse: 63   Temp: 98.9 °F (37.2 °C)   Weight: (!) 142 kg (312 lb)   Height: 167.6 cm (65.98\")     Body mass index is 50.38 kg/m².     Physical Exam  Vitals reviewed.   Constitutional:       Appearance: Normal appearance. She is well-developed. She is obese. She is not ill-appearing.   HENT:      Head: Normocephalic and atraumatic.      Right Ear: Tympanic membrane, ear canal and external ear normal.      Left Ear: Tympanic membrane, ear canal and external ear normal.      Mouth/Throat:      Lips: Pink.      Mouth: Mucous membranes are moist.      Pharynx: Oropharynx is clear. Uvula midline. No oropharyngeal exudate.   Eyes:      Extraocular Movements: Extraocular movements intact.      Conjunctiva/sclera: Conjunctivae normal.      Pupils: Pupils are equal, round, and reactive to light.   Neck:      Thyroid: No thyromegaly.   Cardiovascular:      Rate and Rhythm: Normal rate and regular rhythm.      Heart sounds: No murmur heard.  No friction rub. No gallop.    Pulmonary:      Effort: Pulmonary effort is normal.      Breath sounds: Normal breath sounds. No wheezing or rhonchi.   Abdominal:      General: Bowel sounds are normal. There is no distension.      Palpations: Abdomen is soft.      Tenderness: There is no abdominal tenderness.   Musculoskeletal:      Cervical back: Normal range of motion.   Lymphadenopathy:      Head:      Right side of head: No submandibular adenopathy.      Left side of head: No submandibular adenopathy.      Cervical: No cervical adenopathy.   Skin:     General: Skin is warm and dry.      Findings: No lesion or rash.   Neurological:      Mental Status: She is alert and oriented to person, place, and time.      " Cranial Nerves: No cranial nerve deficit.      Gait: Gait is intact.   Psychiatric:         Mood and Affect: Mood and affect normal.         Behavior: Behavior normal.         Thought Content: Thought content normal.         Judgment: Judgment normal.           Result Review :                               Assessment and Plan      Diagnoses and all orders for this visit:    1. Routine general medical examination at a health care facility (Primary)  Comments:  diet and exercise recommended, annual follow up , flu vaccine recommended     2. Anxiety  Comments:  Increase the sertraline to 100mg daily.  discussed alternative.  She wishes to continue medication daily and PRN- follow up in 3 months  Assessment & Plan:  Currently taking 100mg  Controlled on current dose  Has not had to take propranolol much over the past few months    Orders:  -     sertraline (Zoloft) 100 MG tablet; Take 1 tablet by mouth Daily.  Dispense: 30 tablet; Refill: 5    3. Bariatric surgery status  -     Iron level; Future  -     Vitamin B12; Future    4. Gastroesophageal reflux disease, unspecified whether esophagitis present  Assessment & Plan:  Tums PRN        5. Recurrent major depressive disorder, in full remission ([facility])  Assessment & Plan:  Patient's depression is recurrent and is moderate without psychosis. Their depression is currently in full remission and the condition is unchanged. This will be reassessed at the next regular appointment. F/U as described:patient will continue current medication therapy.      6. Hidradenitis suppurativa of right axilla  Assessment & Plan:  Has had to have I&D in October at  - stable for now       7. Palpitations  Assessment & Plan:  Controlled with anxiety controlled       8. Essential (primary) hypertension  Assessment & Plan:  Hypertension is unchanged.  Continue current treatment regimen.  Weight loss.  Blood pressure will be reassessed at the next regular appointment.      9. Screening for viral  disease  -     Hepatitis C antibody; Future    10. Screening for cardiovascular condition  -     Comprehensive metabolic panel; Future  -     CBC No Differential; Future  -     Lipid panel; Future            Follow Up     Return in about 6 months (around 6/20/2022).

## 2021-12-20 NOTE — ASSESSMENT & PLAN NOTE
Currently taking 100mg  Controlled on current dose  Has not had to take propranolol much over the past few months

## 2022-04-04 ENCOUNTER — OFFICE VISIT (OUTPATIENT)
Dept: FAMILY MEDICINE CLINIC | Age: 32
End: 2022-04-04

## 2022-04-04 VITALS
BODY MASS INDEX: 47.09 KG/M2 | HEART RATE: 77 BPM | HEIGHT: 66 IN | OXYGEN SATURATION: 97 % | DIASTOLIC BLOOD PRESSURE: 79 MMHG | TEMPERATURE: 97.5 F | WEIGHT: 293 LBS | SYSTOLIC BLOOD PRESSURE: 133 MMHG

## 2022-04-04 DIAGNOSIS — J35.1 ENLARGED TONSILS: ICD-10-CM

## 2022-04-04 DIAGNOSIS — R05.9 COUGH: ICD-10-CM

## 2022-04-04 DIAGNOSIS — J01.00 ACUTE NON-RECURRENT MAXILLARY SINUSITIS: Primary | ICD-10-CM

## 2022-04-04 PROCEDURE — 99213 OFFICE O/P EST LOW 20 MIN: CPT | Performed by: NURSE PRACTITIONER

## 2022-04-04 RX ORDER — DEXTROMETHORPHAN HYDROBROMIDE AND PROMETHAZINE HYDROCHLORIDE 15; 6.25 MG/5ML; MG/5ML
5 SYRUP ORAL 4 TIMES DAILY PRN
Qty: 240 ML | Refills: 0 | Status: SHIPPED | OUTPATIENT
Start: 2022-04-04 | End: 2022-08-22

## 2022-04-04 RX ORDER — AZITHROMYCIN 250 MG/1
TABLET, FILM COATED ORAL
Qty: 6 TABLET | Refills: 0 | Status: SHIPPED | OUTPATIENT
Start: 2022-04-04 | End: 2022-08-22

## 2022-04-04 NOTE — PROGRESS NOTES
Chief Complaint  Cough, Sinusitis, and Ear Fullness    Subjective    Patient is a 31-year-old female in today with complaints of cough, nasal congestion, headache, sinus pain and pressure, neck pain and ear fullness that began about 1 week ago.  Patient has been taking over-the-counter cough suppressants which has provided mild relief.  Patient reports that when she was seen back in the summer for this her tonsils were swollen, and she says her tonsils are still swollen at this time and she would like to see about seeing a specialist about that. Denies fever or shortness of breath at this time.          Lorraine Nayak presents to [facility]  Cough  This is a new problem. The current episode started in the past 7 days. The problem has been gradually worsening. The cough is productive of sputum. Associated symptoms include ear pain, headaches, nasal congestion, postnasal drip and rhinorrhea. Pertinent negatives include no chest pain, fever, sore throat or shortness of breath. The symptoms are aggravated by lying down and pollens. She has tried OTC cough suppressant for the symptoms. The treatment provided mild relief. Her past medical history is significant for environmental allergies.   Sinusitis  This is a new problem. The current episode started in the past 7 days. The problem has been gradually worsening since onset. There has been no fever. Associated symptoms include congestion, coughing, ear pain, headaches, a hoarse voice, neck pain and sinus pressure. Pertinent negatives include no shortness of breath or sore throat. Past treatments include oral decongestants. The treatment provided no relief.   Ear Fullness   There is pain in the left ear. This is a new problem. The current episode started in the past 7 days. There has been no fever. Associated symptoms include coughing, headaches, neck pain and rhinorrhea. Pertinent negatives include no sore throat. She has tried  "nothing for the symptoms. The treatment provided mild relief.       Objective   Vital Signs:   /79   Pulse 77   Temp 97.5 °F (36.4 °C) (Oral)   Ht 167.6 cm (65.98\")   Wt 136 kg (298 lb 12.8 oz)   SpO2 97%   BMI 48.25 kg/m²            Physical Exam  HENT:      Head: Normocephalic.      Right Ear: A middle ear effusion is present.      Left Ear: A middle ear effusion is present.      Nose: Congestion present.      Right Sinus: Maxillary sinus tenderness and frontal sinus tenderness present.      Left Sinus: Maxillary sinus tenderness and frontal sinus tenderness present.      Mouth/Throat:      Tonsils: No tonsillar exudate or tonsillar abscesses. 3+ on the right. 3+ on the left.   Cardiovascular:      Rate and Rhythm: Normal rate and regular rhythm.   Pulmonary:      Effort: Pulmonary effort is normal. No respiratory distress.      Breath sounds: Normal breath sounds. No stridor. No wheezing, rhonchi or rales.   Skin:     General: Skin is warm and dry.   Neurological:      Mental Status: She is alert and oriented to person, place, and time.   Psychiatric:         Mood and Affect: Mood normal.        Result Review :                 Assessment and Plan    Diagnoses and all orders for this visit:    1. Acute non-recurrent maxillary sinusitis (Primary)  -     azithromycin (Zithromax Z-Delfino) 250 MG tablet; Take 2 tablets by mouth on day 1, then 1 tablet daily on days 2-5  Dispense: 6 tablet; Refill: 0    2. Cough  -     promethazine-dextromethorphan (PROMETHAZINE-DM) 6.25-15 MG/5ML syrup; Take 5 mL by mouth 4 (Four) Times a Day As Needed for Cough.  Dispense: 240 mL; Refill: 0    3. Enlarged tonsils  -     Ambulatory Referral to ENT (Otolaryngology)        Follow Up   Return if symptoms worsen or fail to improve.  Patient was given instructions and counseling regarding her condition or for health maintenance advice. Please see specific information pulled into the AVS if appropriate.       "

## 2022-08-08 DIAGNOSIS — F41.9 ANXIETY: Chronic | ICD-10-CM

## 2022-08-10 RX ORDER — SERTRALINE HYDROCHLORIDE 100 MG/1
100 TABLET, FILM COATED ORAL DAILY
Qty: 90 TABLET | Refills: 0 | Status: SHIPPED | OUTPATIENT
Start: 2022-08-10 | End: 2022-08-22 | Stop reason: SDUPTHER

## 2022-08-22 ENCOUNTER — LAB (OUTPATIENT)
Dept: LAB | Facility: HOSPITAL | Age: 32
End: 2022-08-22

## 2022-08-22 ENCOUNTER — OFFICE VISIT (OUTPATIENT)
Dept: FAMILY MEDICINE CLINIC | Age: 32
End: 2022-08-22

## 2022-08-22 VITALS
BODY MASS INDEX: 47.09 KG/M2 | HEART RATE: 55 BPM | WEIGHT: 293 LBS | DIASTOLIC BLOOD PRESSURE: 71 MMHG | SYSTOLIC BLOOD PRESSURE: 114 MMHG | HEIGHT: 66 IN | OXYGEN SATURATION: 98 % | TEMPERATURE: 98.5 F

## 2022-08-22 DIAGNOSIS — R00.2 PALPITATIONS: Chronic | ICD-10-CM

## 2022-08-22 DIAGNOSIS — F41.9 ANXIETY: Chronic | ICD-10-CM

## 2022-08-22 LAB
ALBUMIN SERPL-MCNC: 4.4 G/DL (ref 3.5–5.2)
ALBUMIN/GLOB SERPL: 1.9 G/DL
ALP SERPL-CCNC: 87 U/L (ref 39–117)
ALT SERPL W P-5'-P-CCNC: 13 U/L (ref 1–33)
ANION GAP SERPL CALCULATED.3IONS-SCNC: 11.3 MMOL/L (ref 5–15)
AST SERPL-CCNC: 15 U/L (ref 1–32)
BILIRUB SERPL-MCNC: 0.7 MG/DL (ref 0–1.2)
BUN SERPL-MCNC: 8 MG/DL (ref 6–20)
BUN/CREAT SERPL: 10.7 (ref 7–25)
CALCIUM SPEC-SCNC: 9 MG/DL (ref 8.6–10.5)
CHLORIDE SERPL-SCNC: 103 MMOL/L (ref 98–107)
CO2 SERPL-SCNC: 26.7 MMOL/L (ref 22–29)
CREAT SERPL-MCNC: 0.75 MG/DL (ref 0.57–1)
EGFRCR SERPLBLD CKD-EPI 2021: 110 ML/MIN/1.73
GLOBULIN UR ELPH-MCNC: 2.3 GM/DL
GLUCOSE SERPL-MCNC: 85 MG/DL (ref 65–99)
POTASSIUM SERPL-SCNC: 3.8 MMOL/L (ref 3.5–5.2)
PROT SERPL-MCNC: 6.7 G/DL (ref 6–8.5)
SODIUM SERPL-SCNC: 141 MMOL/L (ref 136–145)

## 2022-08-22 PROCEDURE — 36415 COLL VENOUS BLD VENIPUNCTURE: CPT

## 2022-08-22 PROCEDURE — 99213 OFFICE O/P EST LOW 20 MIN: CPT | Performed by: NURSE PRACTITIONER

## 2022-08-22 PROCEDURE — 80053 COMPREHEN METABOLIC PANEL: CPT

## 2022-08-22 RX ORDER — SERTRALINE HYDROCHLORIDE 100 MG/1
100 TABLET, FILM COATED ORAL DAILY
Qty: 90 TABLET | Refills: 1 | Status: SHIPPED | OUTPATIENT
Start: 2022-08-22 | End: 2023-02-22 | Stop reason: SDUPTHER

## 2022-08-22 RX ORDER — PROPRANOLOL HYDROCHLORIDE 10 MG/1
10 TABLET ORAL 3 TIMES DAILY PRN
Qty: 30 TABLET | Refills: 1 | Status: SHIPPED | OUTPATIENT
Start: 2022-08-22 | End: 2023-02-22 | Stop reason: SDUPTHER

## 2022-08-22 NOTE — PROGRESS NOTES
Answers for HPI/ROS submitted by the patient on 8/15/2022  Please describe your symptoms.: None  Have you had these symptoms before?: No  How long have you been having these symptoms?: 1-4 days  Please list any medications you are currently taking for this condition.: Sertraline hcl 100 mg tab  Please describe any probable cause for these symptoms. : Medication refill  What is the primary reason for your visit?: Other    Assessment and Plan   Diagnoses and all orders for this visit:    1. Anxiety  Comments:  continue  sertraline to 100mg daily.    Orders:  -     propranolol (INDERAL) 10 MG tablet; Take 1 tablet by mouth 3 (Three) Times a Day As Needed (palpitations/anxiety) for up to 30 doses.  Dispense: 30 tablet; Refill: 1  -     sertraline (Zoloft) 100 MG tablet; Take 1 tablet by mouth Daily.  Dispense: 90 tablet; Refill: 1  -     Comprehensive metabolic panel; Future    2. Palpitations  Comments:  follow up if increase in frequency  Orders:  -     propranolol (INDERAL) 10 MG tablet; Take 1 tablet by mouth 3 (Three) Times a Day As Needed (palpitations/anxiety) for up to 30 doses.  Dispense: 30 tablet; Refill: 1  -     Comprehensive metabolic panel; Future                  Follow Up   Return in about 6 months (around 2/22/2023) for Annual physical, Recheck.    Chief Complaint  Lorraine Nayak presents to [facility] for Hypertension (Med refills), Anxiety, Depression, and Palpitations    Subjective          Lorraine is here for follow up on depression.  She is reporting that Her medication is working well without side effects.  Current treatment includes sertraline 100mg.    Lorraine presents today for follow up on anxiety.    She reports they are doing well on current treatment of   Zoloft  Lorraine has been on this medication at the current dose for over 1 year and feels it is working well for her.              Review of Systems    Objective     Vitals:    08/22/22 0810   BP:  "114/71   BP Location: Left arm   Patient Position: Sitting   Cuff Size: Large Adult   Pulse: 55   Temp: 98.5 °F (36.9 °C)   TempSrc: Oral   SpO2: 98%   Weight: (!) 137 kg (302 lb)   Height: 167.6 cm (65.98\")     Body mass index is 48.77 kg/m².     Physical Exam  Constitutional:       General: She is not in acute distress.     Appearance: Normal appearance.   HENT:      Head: Normocephalic.   Cardiovascular:      Rate and Rhythm: Normal rate and regular rhythm.   Pulmonary:      Effort: Pulmonary effort is normal.      Breath sounds: Normal breath sounds.   Musculoskeletal:         General: Normal range of motion.   Neurological:      General: No focal deficit present.      Mental Status: She is alert and oriented to person, place, and time.   Psychiatric:         Mood and Affect: Mood normal.         Behavior: Behavior normal.         Result Review                        No Known Allergies   Past Medical History:   Diagnosis Date   • Bariatric surgery status    • Essential (primary) hypertension    • Generalized anxiety disorder    • Nutritional anemia, unspecified      Current Outpatient Medications   Medication Sig Dispense Refill   • levonorgestrel (MIRENA) 20 MCG/24HR IUD 1 each by Intrauterine route 1 (One) Time.     • propranolol (INDERAL) 10 MG tablet Take 1 tablet by mouth 3 (Three) Times a Day As Needed (palpitations/anxiety) for up to 30 doses. 30 tablet 1   • sertraline (Zoloft) 100 MG tablet Take 1 tablet by mouth Daily. 90 tablet 1     No current facility-administered medications for this visit.     Past Surgical History:   Procedure Laterality Date   •  SECTION      X2; 2015, 2019; 2015 was emergency due to pre-eclampsia   • SLEEVE GASTROPLASTY  2017    Dr. Pugh (Does not follow)   • TOOTH EXTRACTION     • WISDOM TOOTH EXTRACTION  10/2013      There are no preventive care reminders to display for this patient.   Immunization History   Administered Date(s) Administered   • COVID-19 " (PFIZER) PURPLE CAP 11/01/2021, 11/22/2021   • Flu Vaccine Quad PF >36MO 09/11/2019   • Flu Vaccine Split Quad 01/04/2019, 01/04/2019, 09/11/2019, 09/11/2019   • HPV, Unspecified 11/12/2012, 11/12/2012, 01/16/2013, 01/16/2013, 05/15/2013, 05/15/2013   • Influenza, Unspecified 10/23/2020, 10/23/2020   • Meningococcal Conjugate 11/09/2011, 11/09/2011, 01/18/2012, 01/18/2012, 03/05/2014, 03/05/2014   • PPD Test 03/19/2014, 03/19/2014   • Rho (D) Immune Globulin 08/06/2019   • Tdap 04/25/2017, 08/06/2019, 08/06/2019

## 2023-01-12 ENCOUNTER — OFFICE VISIT (OUTPATIENT)
Dept: FAMILY MEDICINE CLINIC | Age: 33
End: 2023-01-12
Payer: COMMERCIAL

## 2023-01-12 VITALS
BODY MASS INDEX: 47.09 KG/M2 | HEIGHT: 66 IN | DIASTOLIC BLOOD PRESSURE: 82 MMHG | HEART RATE: 62 BPM | WEIGHT: 293 LBS | SYSTOLIC BLOOD PRESSURE: 136 MMHG

## 2023-01-12 DIAGNOSIS — N39.0 URINARY TRACT INFECTION WITHOUT HEMATURIA, SITE UNSPECIFIED: ICD-10-CM

## 2023-01-12 DIAGNOSIS — M54.50 LOW BACK PAIN, UNSPECIFIED BACK PAIN LATERALITY, UNSPECIFIED CHRONICITY, UNSPECIFIED WHETHER SCIATICA PRESENT: Primary | ICD-10-CM

## 2023-01-12 LAB
BILIRUB BLD-MCNC: NEGATIVE MG/DL
CLARITY, POC: CLEAR
COLOR UR: YELLOW
EXPIRATION DATE: ABNORMAL
GLUCOSE UR STRIP-MCNC: NEGATIVE MG/DL
KETONES UR QL: NEGATIVE
LEUKOCYTE EST, POC: ABNORMAL
Lab: ABNORMAL
NITRITE UR-MCNC: NEGATIVE MG/ML
PH UR: 7 [PH] (ref 5–8)
PROT UR STRIP-MCNC: NEGATIVE MG/DL
RBC # UR STRIP: NEGATIVE /UL
SP GR UR: 1.01 (ref 1–1.03)
UROBILINOGEN UR QL: ABNORMAL

## 2023-01-12 PROCEDURE — 99213 OFFICE O/P EST LOW 20 MIN: CPT | Performed by: NURSE PRACTITIONER

## 2023-01-12 PROCEDURE — 81003 URINALYSIS AUTO W/O SCOPE: CPT | Performed by: NURSE PRACTITIONER

## 2023-01-12 RX ORDER — NITROFURANTOIN 25; 75 MG/1; MG/1
100 CAPSULE ORAL 2 TIMES DAILY
Qty: 10 CAPSULE | Refills: 0 | Status: SHIPPED | OUTPATIENT
Start: 2023-01-12 | End: 2023-01-17
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
## 2023-02-22 ENCOUNTER — LAB (OUTPATIENT)
Dept: LAB | Facility: HOSPITAL | Age: 33
End: 2023-02-22
Payer: COMMERCIAL

## 2023-02-22 ENCOUNTER — OFFICE VISIT (OUTPATIENT)
Dept: FAMILY MEDICINE CLINIC | Age: 33
End: 2023-02-22
Payer: COMMERCIAL

## 2023-02-22 VITALS
OXYGEN SATURATION: 98 % | TEMPERATURE: 97.6 F | HEIGHT: 66 IN | SYSTOLIC BLOOD PRESSURE: 124 MMHG | BODY MASS INDEX: 47.09 KG/M2 | HEART RATE: 60 BPM | DIASTOLIC BLOOD PRESSURE: 80 MMHG | WEIGHT: 293 LBS

## 2023-02-22 DIAGNOSIS — R00.2 PALPITATIONS: Chronic | ICD-10-CM

## 2023-02-22 DIAGNOSIS — Z00.00 ROUTINE GENERAL MEDICAL EXAMINATION AT A HEALTH CARE FACILITY: Primary | ICD-10-CM

## 2023-02-22 DIAGNOSIS — R03.0 ELEVATED BLOOD PRESSURE READING: ICD-10-CM

## 2023-02-22 DIAGNOSIS — F41.9 ANXIETY: Chronic | ICD-10-CM

## 2023-02-22 DIAGNOSIS — Z13.6 SCREENING FOR CARDIOVASCULAR CONDITION: ICD-10-CM

## 2023-02-22 LAB
ANION GAP SERPL CALCULATED.3IONS-SCNC: 7.3 MMOL/L (ref 5–15)
BUN SERPL-MCNC: 11 MG/DL (ref 6–20)
BUN/CREAT SERPL: 13.9 (ref 7–25)
CALCIUM SPEC-SCNC: 9.5 MG/DL (ref 8.6–10.5)
CHLORIDE SERPL-SCNC: 104 MMOL/L (ref 98–107)
CHOLEST SERPL-MCNC: 132 MG/DL (ref 0–200)
CO2 SERPL-SCNC: 27.7 MMOL/L (ref 22–29)
CREAT SERPL-MCNC: 0.79 MG/DL (ref 0.57–1)
EGFRCR SERPLBLD CKD-EPI 2021: 103.3 ML/MIN/1.73
GLUCOSE SERPL-MCNC: 89 MG/DL (ref 65–99)
HDLC SERPL-MCNC: 55 MG/DL (ref 40–60)
LDLC SERPL CALC-MCNC: 63 MG/DL (ref 0–100)
LDLC/HDLC SERPL: 1.16 {RATIO}
POTASSIUM SERPL-SCNC: 4.2 MMOL/L (ref 3.5–5.2)
SODIUM SERPL-SCNC: 139 MMOL/L (ref 136–145)
TRIGL SERPL-MCNC: 65 MG/DL (ref 0–150)
VLDLC SERPL-MCNC: 14 MG/DL (ref 5–40)

## 2023-02-22 PROCEDURE — 80061 LIPID PANEL: CPT

## 2023-02-22 PROCEDURE — 36415 COLL VENOUS BLD VENIPUNCTURE: CPT

## 2023-02-22 PROCEDURE — 99395 PREV VISIT EST AGE 18-39: CPT | Performed by: NURSE PRACTITIONER

## 2023-02-22 PROCEDURE — 80048 BASIC METABOLIC PNL TOTAL CA: CPT

## 2023-02-22 RX ORDER — SERTRALINE HYDROCHLORIDE 100 MG/1
100 TABLET, FILM COATED ORAL DAILY
Qty: 90 TABLET | Refills: 1 | Status: SHIPPED | OUTPATIENT
Start: 2023-02-22 | End: 2023-03-20

## 2023-02-22 RX ORDER — PROPRANOLOL HYDROCHLORIDE 10 MG/1
10 TABLET ORAL 3 TIMES DAILY PRN
Qty: 30 TABLET | Refills: 1 | Status: SHIPPED | OUTPATIENT
Start: 2023-02-22

## 2023-02-22 NOTE — PROGRESS NOTES
Chief Complaint  Lorraine Nayak presents to [facility] for Annual Exam, Anxiety, and Med Refill (/)      Subjective     History of Present Illness  Lorraine is here today for annual exam.   Last annual exam was 1 year  Last eye exam: 2 months  PROVIDER: [facility]   Last dental exam:   6 months    PROVIDER:  [facility]  Last menstrual period:  None secondary to IUD in place.  Diet / exercise:   Low Carb Diet Other: high protein - exercise 3-4 days per week  Patient's Body mass index is 49.58 kg/m². indicating that she is obese (BMI >30).  Patient Care Team:  Lizet iLzama APRN as PCP - General (Nurse Practitioner)  Total Faith Peters presents today for follow up on anxiety.    She reports they are doing well on current treatment of   Zoloft  Lorraine has been on this medication at the current dose for several  years and feels it is working well for her.  She does take propranolol PRN for palpitations and reports having to take this only 1-2 times per week        Assessment and Plan       Diagnoses and all orders for this visit:    1. Routine general medical examination at a health care facility (Primary)  Comments:  diet and exercise, annual wellness, health maintenance recommendations discussed - she will schedule follow up with GYN for pap this year    2. Anxiety  Comments:  continue  sertraline to 100mg daily.- if continues to do well, will consider annual follow up   Orders:  -     propranolol (INDERAL) 10 MG tablet; Take 1 tablet by mouth 3 Times a Day As Needed for palpitations/anxiety  Dispense: 30 tablet; Refill: 1  -     sertraline (Zoloft) 100 MG tablet; Take 1 tablet by mouth Daily.  Dispense: 90 tablet; Refill: 1    3. Palpitations  Comments:  follow up if increase in frequency  Orders:  -     propranolol (INDERAL) 10 MG tablet; Take 1 tablet by mouth 3 Times a Day As Needed for palpitations/anxiety  Dispense: 30 tablet; Refill:  1    4. Elevated blood pressure reading  Comments:  monitor at home, reduce sodium / caffeine - follow up if remains above 120/80    5. Screening for cardiovascular condition  -     Lipid panel; Future  -     Basic metabolic panel; Future        Follow Up   Return in about 6 months (around 8/22/2023) for Recheck.      New Medications Ordered This Visit   Medications   • propranolol (INDERAL) 10 MG tablet     Sig: Take 1 tablet by mouth 3 Times a Day As Needed for palpitations/anxiety     Dispense:  30 tablet     Refill:  1   • sertraline (Zoloft) 100 MG tablet     Sig: Take 1 tablet by mouth Daily.     Dispense:  90 tablet     Refill:  1       Medications Discontinued During This Encounter   Medication Reason   • levonorgestrel (MIRENA) 20 MCG/24HR IUD *Therapy completed   • propranolol (INDERAL) 10 MG tablet Reorder   • sertraline (Zoloft) 100 MG tablet Reorder            Review of Systems   Constitutional: Negative for fatigue, unexpected weight gain and unexpected weight loss.   HENT: Negative for congestion and sinus pressure.    Eyes: Negative for blurred vision and visual disturbance.   Respiratory: Negative for cough and shortness of breath.    Cardiovascular: Positive for palpitations (controlled with medication ). Negative for chest pain and leg swelling.   Gastrointestinal: Positive for indigestion. Negative for abdominal pain, constipation and diarrhea. Nausea: occasional  controlled with OTC.   Genitourinary: Negative for dysuria, frequency and vaginal bleeding.   Musculoskeletal: Negative for arthralgias, back pain and myalgias.   Skin: Negative for rash and skin lesions.   Allergic/Immunologic: Negative for environmental allergies.   Neurological: Negative for dizziness and headache.   Psychiatric/Behavioral: Negative for sleep disturbance and depressed mood. The patient is nervous/anxious (controlled on medication ).        Objective     Vitals:    02/22/23 0811 02/22/23 0900   BP: 136/95 124/80   BP  "Location: Right arm Right arm   Patient Position: Sitting    Pulse: 60    Temp: 97.6 °F (36.4 °C)    TempSrc: Oral    SpO2: 98%    Weight: (!) 139 kg (307 lb)    Height: 167.6 cm (65.98\")      Body mass index is 49.58 kg/m².     Physical Exam  Vitals reviewed.   Constitutional:       Appearance: Normal appearance. She is well-developed. She is obese. She is not ill-appearing.   HENT:      Head: Normocephalic and atraumatic.      Right Ear: Tympanic membrane, ear canal and external ear normal.      Left Ear: Tympanic membrane, ear canal and external ear normal.      Mouth/Throat:      Lips: Pink.      Mouth: Mucous membranes are moist.      Pharynx: Oropharynx is clear. Uvula midline. No oropharyngeal exudate.   Eyes:      Extraocular Movements: Extraocular movements intact.      Conjunctiva/sclera: Conjunctivae normal.      Pupils: Pupils are equal, round, and reactive to light.   Neck:      Thyroid: No thyromegaly.   Cardiovascular:      Rate and Rhythm: Normal rate and regular rhythm.      Heart sounds: No murmur heard.    No friction rub. No gallop.   Pulmonary:      Effort: Pulmonary effort is normal.      Breath sounds: Normal breath sounds. No wheezing or rhonchi.   Abdominal:      General: Bowel sounds are normal. There is no distension.      Palpations: Abdomen is soft.      Tenderness: There is no abdominal tenderness.   Musculoskeletal:      Cervical back: Normal range of motion.   Lymphadenopathy:      Head:      Right side of head: No submandibular adenopathy.      Left side of head: No submandibular adenopathy.      Cervical: No cervical adenopathy.   Skin:     General: Skin is warm and dry.      Findings: No lesion or rash.   Neurological:      Mental Status: She is alert and oriented to person, place, and time.      Cranial Nerves: No cranial nerve deficit.      Gait: Gait is intact.   Psychiatric:         Mood and Affect: Mood and affect normal.         Behavior: Behavior normal.         Thought " Content: Thought content normal.         Judgment: Judgment normal.            Result Review                       No Known Allergies   Past Medical History:   Diagnosis Date   • Bariatric surgery status    • Essential (primary) hypertension    • Generalized anxiety disorder    • Nutritional anemia, unspecified      Current Outpatient Medications   Medication Sig Dispense Refill   • Levonorgestrel (MIRENA) 20 MCG/DAY intrauterine device IUD 1 each by Intrauterine route.     • propranolol (INDERAL) 10 MG tablet Take 1 tablet by mouth 3 Times a Day As Needed for palpitations/anxiety 30 tablet 1   • sertraline (Zoloft) 100 MG tablet Take 1 tablet by mouth Daily. 90 tablet 1     No current facility-administered medications for this visit.     Past Surgical History:   Procedure Laterality Date   •  SECTION      X2; , 2019; 2015 was emergency due to pre-eclampsia   • SLEEVE GASTROPLASTY  2017    Dr. Pugh (Does not follow)   • TOOTH EXTRACTION     • WISDOM TOOTH EXTRACTION  10/2013      There are no preventive care reminders to display for this patient.   Immunization History   Administered Date(s) Administered   • COVID-19 (PFIZER) PURPLE CAP 2021, 2021   • Flu Vaccine Quad PF >36MO 2019   • Flu Vaccine Split Quad 2019, 2019, 2019, 2019   • HPV, Unspecified 2012, 2012, 2013, 2013, 05/15/2013, 05/15/2013   • Influenza, Unspecified 10/23/2020, 10/23/2020   • Meningococcal Conjugate 2011, 2011, 2012, 2012, 2014, 2014   • PPD Test 2014, 2014   • Rho (D) Immune Globulin 2019   • Tdap 2017, 2019, 2019

## 2023-03-18 DIAGNOSIS — F41.9 ANXIETY: Chronic | ICD-10-CM

## 2023-03-20 RX ORDER — SERTRALINE HYDROCHLORIDE 100 MG/1
100 TABLET, FILM COATED ORAL DAILY
Qty: 90 TABLET | Refills: 1 | Status: SHIPPED | OUTPATIENT
Start: 2023-03-20

## 2023-06-08 DIAGNOSIS — F41.9 ANXIETY: Chronic | ICD-10-CM

## 2023-06-08 NOTE — TELEPHONE ENCOUNTER
Caller: Lorraine Nayak    Relationship: Self    Best call back number: [phone]    Requested Prescriptions:   Requested Prescriptions     Pending Prescriptions Disp Refills    sertraline (ZOLOFT) 100 MG tablet 90 tablet 1     Sig: Take 1 tablet by mouth Daily.        Pharmacy where request should be sent: [facility] [id number] [place], [place] - [address] [facility] - [phone]  - [phone]     Last office visit with prescribing clinician: 2/22/2023   Last telemedicine visit with prescribing clinician: Visit date not found   Next office visit with prescribing clinician: 8/21/2023     Additional details provided by patient:      Does the patient have less than a 3 day supply:  [] Yes  [x] No    Would you like a call back once the refill request has been completed: [] Yes [x] No    If the office needs to give you a call back, can they leave a voicemail: [] Yes [x] No    Rui Martinez   06/08/23 08:46 EDT

## 2023-06-09 RX ORDER — SERTRALINE HYDROCHLORIDE 100 MG/1
100 TABLET, FILM COATED ORAL DAILY
Qty: 90 TABLET | Refills: 1 | OUTPATIENT
Start: 2023-06-09

## 2023-06-09 NOTE — TELEPHONE ENCOUNTER
Rx Refill Note  Requested Prescriptions     Pending Prescriptions Disp Refills    sertraline (ZOLOFT) 100 MG tablet 90 tablet 1     Sig: Take 1 tablet by mouth Daily.      Last office visit with prescribing clinician: 2/22/2023     Next office visit with prescribing clinician: 8/21/2023       Montse Barajas LPN  06/09/23, 12:20 EDT   Denied to soon

## 2023-09-19 ENCOUNTER — OFFICE VISIT (OUTPATIENT)
Dept: FAMILY MEDICINE CLINIC | Age: 33
End: 2023-09-19
Payer: COMMERCIAL

## 2023-09-19 VITALS
BODY MASS INDEX: 47.09 KG/M2 | WEIGHT: 293 LBS | DIASTOLIC BLOOD PRESSURE: 90 MMHG | HEART RATE: 86 BPM | HEIGHT: 66 IN | TEMPERATURE: 98.5 F | OXYGEN SATURATION: 96 % | SYSTOLIC BLOOD PRESSURE: 142 MMHG

## 2023-09-19 DIAGNOSIS — E66.01 CLASS 3 SEVERE OBESITY DUE TO EXCESS CALORIES WITHOUT SERIOUS COMORBIDITY WITH BODY MASS INDEX (BMI) OF 50.0 TO 59.9 IN ADULT: ICD-10-CM

## 2023-09-19 DIAGNOSIS — R03.0 ELEVATED BLOOD PRESSURE READING: ICD-10-CM

## 2023-09-19 DIAGNOSIS — F41.1 GENERALIZED ANXIETY DISORDER: Primary | ICD-10-CM

## 2023-09-19 PROCEDURE — 99214 OFFICE O/P EST MOD 30 MIN: CPT | Performed by: NURSE PRACTITIONER

## 2023-09-19 RX ORDER — SEMAGLUTIDE 0.25 MG/.5ML
0.25 INJECTION, SOLUTION SUBCUTANEOUS WEEKLY
Qty: 2 ML | Refills: 0 | Status: SHIPPED | OUTPATIENT
Start: 2023-09-19 | End: 2023-10-11

## 2023-09-19 RX ORDER — SERTRALINE HYDROCHLORIDE 100 MG/1
150 TABLET, FILM COATED ORAL DAILY
Qty: 135 TABLET | Refills: 3 | Status: SHIPPED | OUTPATIENT
Start: 2023-09-19

## 2023-09-19 RX ORDER — SEMAGLUTIDE 0.5 MG/.5ML
0.5 INJECTION, SOLUTION SUBCUTANEOUS WEEKLY
Qty: 2 ML | Refills: 1 | Status: SHIPPED | OUTPATIENT
Start: 2023-09-19 | End: 2023-11-08

## 2023-09-19 NOTE — PROGRESS NOTES
Chief Complaint  Lorraine Nayak presents to [facility] for Anxiety (6 mo. F/U )      Subjective     History of Present Illness    Lorraine presents today for follow up on anxiety.   She reports that the current treatment is not working well at the current dose.  Current treatment includes :  Zoloft   Current, ongoing symptoms include: insomnia, psychomotor agitation.    She denies current suicidal and homicidal ideation.   Lorraine has previously tried  propranolol PRN as well  without benefit or have experienced side effects.     She complains of the following side effects from the treatment: none.   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 She has had bariatic surgery in the past and has noticed over the past years, her weight has been slowly creeping up.  She is interested in weight loss medication.  She is unsure as to what is covered.  She has tried diets and has been unsuccessful in her attempts and notes that she has lost willpower.    Assessment and Plan       Diagnoses and all orders for this visit:    1. Generalized anxiety disorder (Primary)  Comments:  increase to 150mg daily, discussed that medication may be contributing to weight gain/ will follow up if symptoms persist or worsen- Exercise may help with mood  Orders:  -     sertraline (ZOLOFT) 100 MG tablet; Take 1 & 1/2 tablets by mouth Daily.  Dispense: 135 tablet; Refill: 3    2. Elevated blood pressure reading  Comments:  check at home- RTO if continues to remain elevated  consider treatment    3. Class 3 severe obesity due to excess calories without serious comorbidity with body mass index (BMI) of 50.0 to 59.9 in adult  Comments:  Wegovy covered with PA    may benefit from weight loss due to elevated blood pressure - RTO IN 2-3 MONTHS FOR MONITORING AND  "WEIGHT CHECK  Orders:  -     Semaglutide-Weight Management (Wegovy) 0.25 MG/0.5ML solution auto-injector; Inject 0.25 mg under the skin into the appropriate area as directed 1 (One) Time Per Week for 4 doses.  Dispense: 2 mL; Refill: 0  -     Semaglutide-Weight Management (Wegovy) 0.5 MG/0.5ML solution auto-injector; Inject 0.5 mL under the skin into the appropriate area as directed 1 (One) Time Per Week for 8 doses. TO BE STARTED AFTER 4 WEEKLY DOSES OF 0.25 MG  Dispense: 2 mL; Refill: 1            Follow Up   Return in about 6 months (around 3/19/2024) for Recheck, Annual physical  if start weight loss medication- will need 2-3 month follow up .      New Medications Ordered This Visit   Medications    sertraline (ZOLOFT) 100 MG tablet     Sig: Take 1 & 1/2 tablets by mouth Daily.     Dispense:  135 tablet     Refill:  3     Requesting 1 year supply    Semaglutide-Weight Management (Wegovy) 0.25 MG/0.5ML solution auto-injector     Sig: Inject 0.25 mg under the skin into the appropriate area as directed 1 (One) Time Per Week for 4 doses.     Dispense:  2 mL     Refill:  0    Semaglutide-Weight Management (Wegovy) 0.5 MG/0.5ML solution auto-injector     Sig: Inject 0.5 mL under the skin into the appropriate area as directed 1 (One) Time Per Week for 8 doses. TO BE STARTED AFTER 4 WEEKLY DOSES OF 0.25 MG     Dispense:  2 mL     Refill:  1       Medications Discontinued During This Encounter   Medication Reason    sertraline (ZOLOFT) 100 MG tablet Reorder          Review of Systems    Objective     Vitals:    09/19/23 1445 09/19/23 1507   BP: 156/86 142/90   BP Location: Other (Comment) Other (Comment)   Patient Position: Sitting Sitting   Cuff Size: Adult Adult   Pulse: 61 86   Temp: 98.5 °F (36.9 °C)    TempSrc: Oral    SpO2: 97% 96%   Weight: (!) 145 kg (319 lb)    Height: 167.6 cm (65.98\")      Body mass index is 51.52 kg/m².          Physical Exam  Constitutional:       General: She is not in acute distress.     " Appearance: Normal appearance. She is obese.   HENT:      Head: Normocephalic.   Cardiovascular:      Rate and Rhythm: Normal rate and regular rhythm.   Pulmonary:      Effort: Pulmonary effort is normal.      Breath sounds: Normal breath sounds.   Musculoskeletal:         General: Normal range of motion.   Neurological:      General: No focal deficit present.      Mental Status: She is alert and oriented to person, place, and time.   Psychiatric:         Mood and Affect: Mood normal. Mood is anxious.         Behavior: Behavior normal.          Result Review               No Known Allergies   Past Medical History:   Diagnosis Date    Bariatric surgery status     Essential (primary) hypertension     Generalized anxiety disorder     Nutritional anemia, unspecified      Current Outpatient Medications   Medication Sig Dispense Refill    Levonorgestrel (MIRENA) 20 MCG/DAY intrauterine device IUD 1 each by Intrauterine route.      propranolol (INDERAL) 10 MG tablet Take 1 tablet by mouth 3 Times a Day As Needed for palpitations/anxiety 30 tablet 1    sertraline (ZOLOFT) 100 MG tablet Take 1 & 1/2 tablets by mouth Daily. 135 tablet 3    Semaglutide-Weight Management (Wegovy) 0.25 MG/0.5ML solution auto-injector Inject 0.25 mg under the skin into the appropriate area as directed 1 (One) Time Per Week for 4 doses. 2 mL 0    Semaglutide-Weight Management (Wegovy) 0.5 MG/0.5ML solution auto-injector Inject 0.5 mL under the skin into the appropriate area as directed 1 (One) Time Per Week for 8 doses. TO BE STARTED AFTER 4 WEEKLY DOSES OF 0.25 MG 2 mL 1     No current facility-administered medications for this visit.     Past Surgical History:   Procedure Laterality Date     SECTION      X2; 2015, 2019; 2015 was emergency due to pre-eclampsia    SLEEVE GASTROPLASTY  2017    Dr. Pugh (Does not follow)    TOOTH EXTRACTION      WISDOM TOOTH EXTRACTION  10/2013      There are no preventive care reminders to display for  this patient.   Immunization History   Administered Date(s) Administered    COVID-19 (PFIZER) Purple Cap Monovalent 11/01/2021, 11/22/2021    Flu Vaccine Split Quad 01/04/2019, 09/11/2019    Fluzone (or Fluarix & Flulaval for VFC) >6mos 09/11/2019    HPV, Unspecified 11/12/2012, 01/16/2013, 05/15/2013    Influenza, Unspecified 10/23/2020    Meningococcal Conjugate 11/09/2011, 01/18/2012, 03/05/2014    PPD Test 03/19/2014, 06/21/2023    Rho (D) Immune Globulin 08/06/2019    Tdap 04/25/2017, 08/06/2019, 08/06/2019         Part of this note may be an electronic transcription/translation of spoken language to printed   text using the Dragon Dictation System.      Lizet Lizama APRN

## 2023-09-25 DIAGNOSIS — E66.01 CLASS 3 SEVERE OBESITY DUE TO EXCESS CALORIES WITHOUT SERIOUS COMORBIDITY WITH BODY MASS INDEX (BMI) OF 50.0 TO 59.9 IN ADULT: Primary | ICD-10-CM

## 2023-10-03 DIAGNOSIS — E66.01 CLASS 3 SEVERE OBESITY DUE TO EXCESS CALORIES WITHOUT SERIOUS COMORBIDITY WITH BODY MASS INDEX (BMI) OF 50.0 TO 59.9 IN ADULT: ICD-10-CM
